# Patient Record
Sex: MALE | Race: WHITE | Employment: FULL TIME | URBAN - METROPOLITAN AREA
[De-identification: names, ages, dates, MRNs, and addresses within clinical notes are randomized per-mention and may not be internally consistent; named-entity substitution may affect disease eponyms.]

---

## 2017-06-01 ENCOUNTER — OFFICE VISIT (OUTPATIENT)
Dept: NEUROLOGY | Age: 47
End: 2017-06-01

## 2017-06-01 VITALS
HEIGHT: 70 IN | BODY MASS INDEX: 27.77 KG/M2 | HEART RATE: 73 BPM | WEIGHT: 194 LBS | SYSTOLIC BLOOD PRESSURE: 124 MMHG | DIASTOLIC BLOOD PRESSURE: 84 MMHG | RESPIRATION RATE: 15 BRPM | OXYGEN SATURATION: 97 % | TEMPERATURE: 99.1 F

## 2017-06-01 DIAGNOSIS — G44.029 CHRONIC CLUSTER HEADACHE, NOT INTRACTABLE: Primary | ICD-10-CM

## 2017-06-01 RX ORDER — RIZATRIPTAN BENZOATE 10 MG/1
10 TABLET ORAL
Qty: 9 TAB | Refills: 5 | Status: SHIPPED | OUTPATIENT
Start: 2017-06-01 | End: 2018-09-12 | Stop reason: SDUPTHER

## 2017-06-01 RX ORDER — SUMATRIPTAN 100 MG/1
100 TABLET, FILM COATED ORAL
Qty: 27 TAB | Refills: 3 | Status: SHIPPED | OUTPATIENT
Start: 2017-06-01 | End: 2017-09-28 | Stop reason: SDUPTHER

## 2017-06-01 NOTE — PROGRESS NOTES
[address] [facility] [address]   [place] [address]   [place], [address]    [facility]   [phone] Fax               Chief Complaint   Patient presents with    Migraine     follow up     Current Outpatient Prescriptions   Medication Sig Dispense Refill    SUMAtriptan (IMITREX) 100 mg tablet Take 1 Tab by mouth once as needed for Migraine for up to 1 dose. 27 Tab 3    SUMAtriptan succinate (ZEMBRACE SYMTOUCH) 3 mg/0.5 mL pnij 1 Syringe by SubCUTAneous route as needed. 1 at HA onset and repeat q 1 hour until STEWART relieved or until used 4 in 24 hours 8 Syringe 5    rizatriptan (MAXALT) 10 mg tablet Take 1 Tab by mouth once as needed for Migraine for up to 1 dose. May repeat in 2 hours if needed 9 Tab 5    verapamil (CALAN) 40 mg tablet Take 1 Tab by mouth three (3) times daily. 90 Tab 2      No Known Allergies  Social History   Substance Use Topics    Smoking status: Never Smoker    Smokeless tobacco: Never Used    Alcohol use Yes     Patient returns today for follow-up cluster headaches. He typically gets clusters in August.  They have not yet recurred. We discussed his medication regimen when they occur. He has not had any new symptoms. Doing well. No fever or chills. No edema. No chest pain. Examination  Visit Vitals    /84    Pulse 73    Temp 99.1 °F (37.3 °C)    Resp 15    Ht 5' 10\" (1.778 m)    Wt 88 kg (194 lb)    SpO2 97%    BMI 27.84 kg/m2     He is a very pleasant gentleman. He is awake alert oriented and conversant. Normal speech and language. No icterus. Steady gait. Impression/Plan  Cluster headaches which at this point and not recurred. Historically he gets a cluster around August.  I have given him some Zembrace samples. I have also given him a prescription for Zembrace and a card and that will give him his 100 mg Imitrex tablets free with the Zembrace. I refilled his Maxalt as well.   We discussed what to do if the cluster starts as it is expected to do. As long as he can control his headaches with this regimen then I will see him in 6 months. Certainly if he gets into the cluster we cannot control the headaches he will give us a call and we will make manipulation such as giving him some Decadron as well as starting him on Depakote or even sending him to the infusion center etc.  We will set follow-up at this point for 6 months but again certainly bring her back earlier if needed. This note was created using voice recognition software. Despite editing, there may be syntax errors. This note will not be viewable in [address]

## 2017-06-01 NOTE — MR AVS SNAPSHOT
Visit Information Date & Time Provider Department Dept. Phone Encounter #  
 6/1/2017  8:40 AM Ginger Branch MD [facility] [phone] [id number] Follow-up Instructions Return in about 6 months (around 12/1/2017). Upcoming Health Maintenance Date Due DTaP/Tdap/Td series (1 - Tdap) 6/10/1990 INFLUENZA AGE 9 TO ADULT 8/1/2017 Allergies as of 6/1/2017  Review Complete On: 6/1/2017 By: Aldo Nava LPN No Known Allergies Current Immunizations  Never Reviewed No immunizations on file. Not reviewed this visit You Were Diagnosed With   
  
 Codes Comments Chronic cluster headache, not intractable    -  Primary ICD-10-CM: G44.029 
ICD-9-CM: 339.02 Vitals BP Pulse Temp Resp Height(growth percentile) Weight(growth percentile) 124/84 73 99.1 °F (37.3 °C) 15 5' 10\" (1.778 m) 194 lb (88 kg) SpO2 BMI Smoking Status 97% 27.84 kg/m2 Never Smoker Vitals History BMI and BSA Data Body Mass Index Body Surface Area  
 27.84 kg/m 2 2.08 m 2 Preferred Pharmacy Pharmacy Name Phone [facility] [address] [facility] [phone] Your Updated Medication List  
  
   
This list is accurate as of: 6/1/17  9:23 AM.  Always use your most recent med list.  
  
  
  
  
 rizatriptan 10 mg tablet Commonly known as:  Kaci [place] Take 1 Tab by mouth once as needed for Migraine for up to 1 dose. May repeat in 2 hours if needed * SUMAtriptan 100 mg tablet Commonly known as:  IMITREX Take 1 Tab by mouth once as needed for Migraine for up to 1 dose. * SUMAtriptan succinate 3 mg/0.5 mL Pnij Commonly known as:  Sage Finney  
1 Syringe by SubCUTAneous route as needed. 1 at HA onset and repeat q 1 hour until STEWART relieved or until used 4 in 24 hours  
  
 verapamil 40 mg tablet Commonly known as:  CALAN Take 1 Tab by mouth three (3) times daily. * Notice: This list has 2 medication(s) that are the same as other medications prescribed for you. Read the directions carefully, and ask your doctor or other care provider to review them with you. Prescriptions Sent to Pharmacy Refills SUMAtriptan (IMITREX) 100 mg tablet 3 Sig: Take 1 Tab by mouth once as needed for Migraine for up to 1 dose. Class: Normal  
 Pharmacy: [address] Ph #: [phone] Route: Oral  
 SUMAtriptan succinate (ZEMBRACE SYMTOUCH) 3 mg/0.5 mL pnij 5 Si Syringe by SubCUTAneous route as needed. 1 at HA onset and repeat q 1 hour until STEWART relieved or until used 4 in 24 hours Class: Normal  
 Pharmacy: [address] Ph #: [phone] Route: SubCUTAneous  
 rizatriptan (MAXALT) 10 mg tablet 5 Sig: Take 1 Tab by mouth once as needed for Migraine for up to 1 dose. May repeat in 2 hours if needed Class: Normal  
 Pharmacy: [address] Ph #: [phone] Route: Oral  
  
Follow-up Instructions Return in about 6 months (around 2017). Patient Instructions Information Regarding Testing If you have physican order for a test or a medication denied by your insurance company, this does not mean the test or medication is not appropriate for you as that is a medical decision, not a decision to be made by an insurance company representative or by an [facility] physician who has not interviewed and examined you. This is a decision to be made between you and your physician. The denial of services is a contractual matter between you and your insurance company, not an issue between your physician and the insurance company. If your test or medication is denied, you can take the following steps to help resolve the issue: 1.  File a complaint with the [facility] of Insurance regarding your insurance company's denial of services ordered for you. You can do this either by calling them directly or by completing an on-line complaint form on the AFFiRiS. This can be found at [email or web address].[email or web address] 2. Also file a formal complaint with your insurance company and ask to have the name of the person denying the service so that you may explore a legal option should you be harmed by this denial of service. Again, the fact the insurance company will not pay for the service does not mean it is not medically necessary and I would encourage you to follow through with the plan that was made with your physician 3. File a written complaint with your employer so your employer and benefit manager is aware of the poor coverage they are providing their employees. If you have medicare/medicaid, complain to your representative in the House and to your Hortencia Conn. PRESCRIPTION REFILL POLICY [facility] Statement to Patients April 1, 2014 In an effort to ensure the large volume of patient prescription refills is processed in the most efficient and expeditious manner, we are asking our patients to assist us by calling your Pharmacy for all prescription refills, this will include also your  Mail Order Pharmacy. The pharmacy will contact our office electronically to continue the refill process. Please do not wait until the last minute to call your pharmacy. We need at least 48 hours (2days) to fill prescriptions. We also encourage you to call your pharmacy before going to  your prescription to make sure it is ready. With regard to controlled substance prescription refill requests (narcotic refills) that need to be picked up at our office, we ask your cooperation by providing us with at least 72 hours (3days) notice that you will need a refill. We will not refill narcotic prescription refill requests after 4:00pm on any weekday, Monday through Thursday, or after 2:00pm on Fridays, or on the weekends. We encourage everyone to explore another way of getting your prescription refill request processed using WeVideo.It, our patient web portal through our electronic medical record system. WeVideo.It is an efficient and effective way to communicate your medication request directly to the office and  downloadable as an heavenly on your smart phone . WeVideo.It also features a review functionality that allows you to view your medication list as well as leave messages for your physician. Are you ready to get connected? If so please review the attatched instructions or speak to any of our staff to get you set up right away! Thank you so much for your cooperation. Should you have any questions please contact our Practice Administrator. The Physicians and Staff,  [facility] If we have ordered testing for you, we do not call patients with results and we do not give test results over the phone. We schedule follow up appointments so that your results can be discussed in person and any questions you have regarding them may be addressed. If something of concern is revealed on your test, we will call you for a sooner follow up appointment. Additionally, results may be found by using the My Chart feature and one of our patient service representatives at the  can give you instructions on how to access this feature of our electronic medical record system. Lavell Magana [address] What is a living will? A living will is a legal form you use to write down the kind of care you want at the end of your life. It is used by the health professionals who will treat you if you aren't able to decide for yourself.  
If you put your wishes in writing, your loved ones and others will know what kind of care you want. They won't need to guess. This can ease your mind and be helpful to others. A living will is not the same as an estate or property will. An estate will explains what you want to happen with your money and property after you die. Is a living will a legal document? A living will is a legal document. Each state has its own laws about living ray. If you move to another state, make sure that your living will is legal in the state where you now live. Or you might use a universal form that has been approved by many states. This kind of form can sometimes be completed and stored online. Your electronic copy will then be available wherever you have a connection to the Internet. In most cases, doctors will respect your wishes even if you have a form from a different state. · You don't need an  to complete a living will. But legal advice can be helpful if your state's laws are unclear, your health history is complicated, or your family can't agree on what should be in your living will. · You can change your living will at any time. Some people find that their wishes about end-of-life care change as their health changes. · In addition to making a living will, think about completing a medical power of  form. This form lets you name the person you want to make end-of-life treatment decisions for you (your \"health care agent\") if you're not able to. Many hospitals and nursing homes will give you the forms you need to complete a living will and a medical power of . · Your living will is used only if you can't make or communicate decisions for yourself anymore. If you become able to make decisions again, you can accept or refuse any treatment, no matter what you wrote in your living will. · Your state may offer an online registry. This is a place where you can store your living will online so the doctors and nurses who need to treat you can find it right away. What should you think about when creating a living will? Talk about your end-of-life wishes with your family members and your doctor. Let them know what you want. That way the people making decisions for you won't be surprised by your choices. Think about these questions as you make your living will: · Do you know enough about life support methods that might be used? If not, talk to your doctor so you know what might be done if you can't breathe on your own, your heart stops, or you're unable to swallow. · What things would you still want to be able to do after you receive life-support methods? Would you want to be able to walk? To speak? To eat on your own? To live without the help of machines? · If you have a choice, where do you want to be cared for? In your home? At a hospital or nursing home? · Do you want certain Protestant practices performed if you become very ill? · If you have a choice at the end of your life, where would you prefer to die? At home? In a hospital or nursing home? Somewhere else? · Would you prefer to be buried or cremated? · Do you want your organs to be donated after you die? What should you do with your living will? · Make sure that your family members and your health care agent have copies of your living will. · Give your doctor a copy of your living will to keep in your medical record. If you have more than one doctor, make sure that each one has a copy. · You may want to put a copy of your living will where it can be easily found. Where can you learn more? Go to http://cara-wally.info/. Enter X711 in the search box to learn more about \"Learning About Living Carol Ann Garcia. \" Current as of: February 24, 2016 Content Version: 11.2 © 7797-1399 [employer]. Care instructions adapted under license by [employer] (which disclaims liability or warranty for this information).  If you have questions about a medical condition or this instruction, always ask your healthcare professional. [address] any warranty or liability for your use of this information. Introducing [facility]! Dear Ailyn Harris: Thank you for requesting a Epiphyte account. Our records indicate that you already have an active Epiphyte account. You can access your account anytime at [email or web address] Did you know that you can access your hospital and ER discharge instructions at any time in Epiphyte? You can also review all of your test results from your hospital stay or ER visit. Additional Information If you have questions, please visit the Frequently Asked Questions section of the Epiphyte website at [email or web address] Remember, Epiphyte is NOT to be used for urgent needs. For medical emergencies, dial 911. Now available from your iPhone and Android! Please provide this summary of care documentation to your next provider. Your primary care clinician is listed as Petra Woody. If you have any questions after today's visit, please call [phone].

## 2017-06-01 NOTE — PATIENT INSTRUCTIONS
Information Regarding Testing     If you have physican order for a test or a medication denied by your insurance company, this does not mean the test or medication is not appropriate for you as that is a medical decision, not a decision to be made by an insurance company representative or by an [facility] physician who has not interviewed and examined you. This is a decision to be made between you and your physician. The denial of services is a contractual matter between you and your insurance company, not an issue between your physician and the insurance company. If your test or medication is denied, you can take the following steps to help resolve the issue:    1. File a complaint with the [facility] regarding your insurance company's denial of services ordered for you. You can do this either by calling them directly or by completing an on-line complaint form on the TraderTools. This can be found at www.The Poker Barrel    2. Also file a formal complaint with your insurance company and ask to have the name of the person denying the service so that you may explore a legal option should you be harmed by this denial of service. Again, the fact the insurance company will not pay for the service does not mean it is not medically necessary and I would encourage you to follow through with the plan that was made with your physician    3. File a written complaint with your employer so your employer and benefit manager is aware of the poor coverage they are providing their employees. If you have medicare/medicaid, complain to your representative in the House and to your Hortencia oCnn.       [facility]   Statement to Patients  April 1, 2014      In an effort to ensure the large volume of patient prescription refills is processed in the most efficient and expeditious manner, we are asking our patients to assist us by calling your Pharmacy for all prescription refills, this will include also your  Mail Order Pharmacy. The pharmacy will contact our office electronically to continue the refill process. Please do not wait until the last minute to call your pharmacy. We need at least 48 hours (2days) to fill prescriptions. We also encourage you to call your pharmacy before going to  your prescription to make sure it is ready. With regard to controlled substance prescription refill requests (narcotic refills) that need to be picked up at our office, we ask your cooperation by providing us with at least 72 hours (3days) notice that you will need a refill. We will not refill narcotic prescription refill requests after 4:00pm on any weekday, Monday through Thursday, or after 2:00pm on Fridays, or on the weekends. We encourage everyone to explore another way of getting your prescription refill request processed using Julep, our patient web portal through our electronic medical record system. Julep is an efficient and effective way to communicate your medication request directly to the office and  downloadable as an heavenly on your smart phone . Julep also features a review functionality that allows you to view your medication list as well as leave messages for your physician. Are you ready to get connected? If so please review the attatched instructions or speak to any of our staff to get you set up right away! Thank you so much for your cooperation. Should you have any questions please contact our Practice Administrator. The Physicians and Staff,  [facility]       If we have ordered testing for you, we do not call patients with results and we do not give test results over the phone. We schedule follow up appointments so that your results can be discussed in person and any questions you have regarding them may be addressed.   If something of concern is revealed on your test, we will call you for a sooner follow up appointment. Additionally, results may be found by using the My Chart feature and one of our patient service representatives at the  can give you instructions on how to access this feature of our electronic medical record system. Learning About Living Nancy  What is a living will? A living will is a legal form you use to write down the kind of care you want at the end of your life. It is used by the health professionals who will treat you if you aren't able to decide for yourself. If you put your wishes in writing, your loved ones and others will know what kind of care you want. They won't need to guess. This can ease your mind and be helpful to others. A living will is not the same as an estate or property will. An estate will explains what you want to happen with your money and property after you die. Is a living will a legal document? A living will is a legal document. Each state has its own laws about living ray. If you move to another state, make sure that your living will is legal in the state where you now live. Or you might use a universal form that has been approved by many states. This kind of form can sometimes be completed and stored online. Your electronic copy will then be available wherever you have a connection to the Internet. In most cases, doctors will respect your wishes even if you have a form from a different state. · You don't need an  to complete a living will. But legal advice can be helpful if your state's laws are unclear, your health history is complicated, or your family can't agree on what should be in your living will. · You can change your living will at any time. Some people find that their wishes about end-of-life care change as their health changes. · In addition to making a living will, think about completing a medical power of  form.  This form lets you name the person you want to make end-of-life treatment decisions for you (your \"health care agent\") if you're not able to. Many hospitals and nursing homes will give you the forms you need to complete a living will and a medical power of . · Your living will is used only if you can't make or communicate decisions for yourself anymore. If you become able to make decisions again, you can accept or refuse any treatment, no matter what you wrote in your living will. · Your state may offer an online registry. This is a place where you can store your living will online so the doctors and nurses who need to treat you can find it right away. What should you think about when creating a living will? Talk about your end-of-life wishes with your family members and your doctor. Let them know what you want. That way the people making decisions for you won't be surprised by your choices. Think about these questions as you make your living will:  · Do you know enough about life support methods that might be used? If not, talk to your doctor so you know what might be done if you can't breathe on your own, your heart stops, or you're unable to swallow. · What things would you still want to be able to do after you receive life-support methods? Would you want to be able to walk? To speak? To eat on your own? To live without the help of machines? · If you have a choice, where do you want to be cared for? In your home? At a hospital or nursing home? · Do you want certain Holiness practices performed if you become very ill? · If you have a choice at the end of your life, where would you prefer to die? At home? In a hospital or nursing home? Somewhere else? · Would you prefer to be buried or cremated? · Do you want your organs to be donated after you die? What should you do with your living will? · Make sure that your family members and your health care agent have copies of your living will. · Give your doctor a copy of your living will to keep in your medical record.  If you have more than one doctor, make sure that each one has a copy. · You may want to put a copy of your living will where it can be easily found. Where can you learn more? Go to http://caar-wally.info/. Enter L202 in the search box to learn more about \"Learning About Living Perroy. \"  Current as of: February 24, 2016  Content Version: 11.2  © 9013-2355 Theracos. Care instructions adapted under license by [employer]Zlogix (which disclaims liability or warranty for this information). If you have questions about a medical condition or this instruction, always ask your healthcare professional. [address] any warranty or liability for your use of this information.

## 2017-09-28 RX ORDER — SUMATRIPTAN 100 MG/1
TABLET, FILM COATED ORAL
Qty: 9 TAB | Refills: 0 | Status: SHIPPED | OUTPATIENT
Start: 2017-09-28 | End: 2018-01-03 | Stop reason: SDUPTHER

## 2018-01-04 RX ORDER — SUMATRIPTAN 100 MG/1
TABLET, FILM COATED ORAL
Qty: 9 TAB | Refills: 0 | Status: SHIPPED | OUTPATIENT
Start: 2018-01-04 | End: 2018-06-14 | Stop reason: SDUPTHER

## 2018-06-14 RX ORDER — SUMATRIPTAN 100 MG/1
TABLET, FILM COATED ORAL
Qty: 9 TAB | Refills: 0 | Status: SHIPPED | OUTPATIENT
Start: 2018-06-14 | End: 2018-09-12 | Stop reason: SDUPTHER

## 2018-09-12 ENCOUNTER — OFFICE VISIT (OUTPATIENT)
Dept: NEUROLOGY | Age: 48
End: 2018-09-12

## 2018-09-12 VITALS
WEIGHT: 196 LBS | BODY MASS INDEX: 28.06 KG/M2 | OXYGEN SATURATION: 98 % | SYSTOLIC BLOOD PRESSURE: 128 MMHG | HEIGHT: 70 IN | HEART RATE: 87 BPM | DIASTOLIC BLOOD PRESSURE: 88 MMHG

## 2018-09-12 DIAGNOSIS — G44.029 CHRONIC CLUSTER HEADACHE, NOT INTRACTABLE: Primary | ICD-10-CM

## 2018-09-12 RX ORDER — PANTOPRAZOLE SODIUM 40 MG/1
40 TABLET, DELAYED RELEASE ORAL DAILY
COMMUNITY

## 2018-09-12 RX ORDER — RIZATRIPTAN BENZOATE 10 MG/1
10 TABLET ORAL
Qty: 9 TAB | Refills: 5 | Status: SHIPPED | OUTPATIENT
Start: 2018-09-12 | End: 2019-04-26 | Stop reason: SDUPTHER

## 2018-09-12 RX ORDER — SUMATRIPTAN 100 MG/1
TABLET, FILM COATED ORAL
Qty: 9 TAB | Refills: 3 | Status: SHIPPED | OUTPATIENT
Start: 2018-09-12 | End: 2019-04-26 | Stop reason: SDUPTHER

## 2018-09-12 NOTE — PATIENT INSTRUCTIONS
PRESCRIPTION REFILL POLICY [facility] Statement to Patients April 1, 2014 In an effort to ensure the large volume of patient prescription refills is processed in the most efficient and expeditious manner, we are asking our patients to assist us by calling your Pharmacy for all prescription refills, this will include also your  Mail Order Pharmacy. The pharmacy will contact our office electronically to continue the refill process. Please do not wait until the last minute to call your pharmacy. We need at least 48 hours (2days) to fill prescriptions. We also encourage you to call your pharmacy before going to  your prescription to make sure it is ready. With regard to controlled substance prescription refill requests (narcotic refills) that need to be picked up at our office, we ask your cooperation by providing us with at least 72 hours (3days) notice that you will need a refill. We will not refill narcotic prescription refill requests after 4:00pm on any weekday, Monday through Thursday, or after 2:00pm on Fridays, or on the weekends. We encourage everyone to explore another way of getting your prescription refill request processed using [employer], our patient web portal through our electronic medical record system. Thera[employer] is an efficient and effective way to communicate your medication request directly to the office and  downloadable as an heavenly on your smart phone . The[employer]Sim also features a review functionality that allows you to view your medication list as well as leave messages for your physician. Are you ready to get connected? If so please review the attatched instructions or speak to any of our staff to get you set up right away! Thank you so much for your cooperation. Should you have any questions please contact our Practice Administrator. The Physicians and Staff,  [facility] Patient Instruction Plan/ Result Policy If we have ordered testing for you, know that; \"NO NEWS IS GOOD NEWS! \" It is our policy that we know longer call patients with results, nor do we  give test results over the phone. We schedule follow up appointments so that your results can be discussed in person. This allows you to address any questions you have regarding the results. If something of concern is revealed on your test, we will contact you to discuss the matter and if needed schedule a sooner follow up appointment. Additionally, results may be found by using the My Chart feature and one of our patient service representatives at the  can give you instructions on how to access this feature to utilize our electronic medical record system. Thank you for your understanding.

## 2018-09-12 NOTE — MR AVS SNAPSHOT
[address] [address] [id number] [place] [address] 
[phone] Patient: Jessica Rivera MRN: [id number] :1970 Visit Information Date & Time Provider Department Dept. Phone Encounter #  
 2018  2:00 PM Bakari Mayfield NP [address] [facility] [phone] [id number] Follow-up Instructions Return in about 6 months (around 3/12/2019). Your Appointments 2019  8:40 AM  
Follow Up with Corinne Pressman, MD  
[facility] Appt Note: cluster headache [place]  [address] [facility] [address] [id number] [phone]  
  
   
 [place]  [address] [address] [address] Upcoming Health Maintenance Date Due DTaP/Tdap/Td series (1 - Tdap) 6/10/1990 Influenza Age 5 to Adult 2018 Allergies as of 2018  Review Complete On: 2017 By: Corinne Pressman, MD  
 No Known Allergies Current Immunizations  Never Reviewed No immunizations on file. Not reviewed this visit You Were Diagnosed With   
  
 Codes Comments Chronic cluster headache, not intractable    -  Primary ICD-10-CM: G44.029 
ICD-9-CM: 339.02 Vitals BP Pulse Height(growth percentile) Weight(growth percentile) SpO2 BMI  
 128/88 87 5' 10\" (1.778 m) 196 lb (88.9 kg) 98% 28.12 kg/m2 Smoking Status Never Smoker BMI and BSA Data Body Mass Index Body Surface Area  
 28.12 kg/m 2 2.1 m 2 Preferred Pharmacy Pharmacy Name Phone [facility] - [address] [facility], [facility] [phone] Your Updated Medication List  
  
   
This list is accurate as of 18  2:11 PM.  Always use your most recent med list.  
  
  
  
  
 pantoprazole 40 mg tablet Commonly known as:  PROTONIX Take 40 mg by mouth daily. rizatriptan 10 mg tablet Commonly known as:  Malaika Shear Take 1 Tab by mouth once as needed for Migraine for up to 1 dose. May repeat in 2 hours if needed * SUMAtriptan succinate 3 mg/0.5 mL Pnij Commonly known as:  Lavinia Ax  
1 Syringe by SubCUTAneous route as needed. 1 at HA onset and repeat q 1 hour until STEWART relieved or until used 4 in 24 hours * SUMAtriptan 100 mg tablet Commonly known as:  IMITREX  
TAKE ONE TABLET BY MOUTH ONCE AS NEEDED FOR MIGRAINE  
  
 verapamil 40 mg tablet Commonly known as:  CALAN Take 1 Tab by mouth three (3) times daily. * Notice: This list has 2 medication(s) that are the same as other medications prescribed for you. Read the directions carefully, and ask your doctor or other care provider to review them with you. Prescriptions Sent to Pharmacy Refills SUMAtriptan (IMITREX) 100 mg tablet 3 Sig: TAKE ONE TABLET BY MOUTH ONCE AS NEEDED FOR MIGRAINE Class: Normal  
 Pharmacy: [address] Ph #: [phone]  
 rizatriptan (MAXALT) 10 mg tablet 5 Sig: Take 1 Tab by mouth once as needed for Migraine for up to 1 dose. May repeat in 2 hours if needed Class: Normal  
 Pharmacy: [address] Ph #: [phone] Route: Oral  
  
Follow-up Instructions Return in about 6 months (around 3/12/2019). Patient Instructions PRESCRIPTION REFILL POLICY [facility] Statement to Patients April 1, 2014 In an effort to ensure the large volume of patient prescription refills is processed in the most efficient and expeditious manner, we are asking our patients to assist us by calling your Pharmacy for all prescription refills, this will include also your  Mail Order Pharmacy. The pharmacy will contact our office electronically to continue the refill process. Please do not wait until the last minute to call your pharmacy. We need at least 48 hours (2days) to fill prescriptions. We also encourage you to call your pharmacy before going to  your prescription to make sure it is ready. With regard to controlled substance prescription refill requests (narcotic refills) that need to be picked up at our office, we ask your cooperation by providing us with at least 72 hours (3days) notice that you will need a refill. We will not refill narcotic prescription refill requests after 4:00pm on any weekday, Monday through Thursday, or after 2:00pm on Fridays, or on the weekends. We encourage everyone to explore another way of getting your prescription refill request processed using [employer], our patient web portal through our electronic medical record system. [employer] is an efficient and effective way to communicate your medication request directly to the office and  downloadable as an heavenly on your smart phone . [employer] also features a review functionality that allows you to view your medication list as well as leave messages for your physician. Are you ready to get connected? If so please review the attatched instructions or speak to any of our staff to get you set up right away! Thank you so much for your cooperation. Should you have any questions please contact our Practice Administrator. The Physicians and Staff,  [facility] Patient Instruction Plan/ Result Policy If we have ordered testing for you, know that; \"NO NEWS IS GOOD NEWS! \" It is our policy that we know longer call patients with results, nor do we  give test results over the phone. We schedule follow up appointments so that your results can be discussed in person. This allows you to address any questions you have regarding the results.   If something of concern is revealed on your test, we will contact you to discuss the matter and if needed schedule a sooner follow up appointment. Additionally, results may be found by using the My Chart feature and one of our patient service representatives at the  can give you instructions on how to access this feature to utilize our electronic medical record system. Thank you for your understanding. Introducing [facility]! Dear Samy Burton: Thank you for requesting a eCareer account. Our records indicate that you already have an active eCareer account. You can access your account anytime at [email or web address] Did you know that you can access your hospital and ER discharge instructions at any time in eCareer? You can also review all of your test results from your hospital stay or ER visit. Additional Information If you have questions, please visit the Frequently Asked Questions section of the eCareer website at [email or web address] Remember, eCareer is NOT to be used for urgent needs. For medical emergencies, dial 911. Now available from your iPhone and Android! Please provide this summary of care documentation to your next provider. Your primary care clinician is listed as Sanjay Gamez. If you have any questions after today's visit, please call [phone].

## 2018-09-12 NOTE — PROGRESS NOTES
Date:  18 Name:  Erik Villafana 
:  1970 MRN:  [id number] PCP:  Jim Brizuela MD 
 
Chief Complaint Patient presents with  
 Head Pain HISTORY OF PRESENT ILLNESS:Follow up visit for cluster headache. patient reports that he tends to get  Clusters headaches in August.  They have not yet recurred. We discussed his medication regimen when they occur. He has not had any new symptoms. Doing well. No fever or chills. No edema. No chest pain Except as noted above, denies  fever, chills, cough. No CP or SOB. No dysuria, loss of bowel or bladder control. No Weight loss. Appetite good. Sleeping well. No sweats. No edema. No bruising or bleeding. No nausea or vomit. No diarrhea. No frequency, urgency, No depressive sxs. No anxiety. Denies sore throat, nasal congestion, nasal discharge, epistaxis, tinnitus, hearing loss, back pain, muscle pain, or joint pain. Current Outpatient Prescriptions Medication Sig  pantoprazole (PROTONIX) 40 mg tablet Take 40 mg by mouth daily.  SUMAtriptan (IMITREX) 100 mg tablet TAKE ONE TABLET BY MOUTH ONCE AS NEEDED FOR MIGRAINE  SUMAtriptan succinate (ONZETRA XSAIL) 11 mg aepb 11 mg by Nasal route as needed. Both nasal at the onset of headache redose in 2 hrs. Indications: BIN number#[id number]  ('\"0 copay)  verapamil (CALAN) 40 mg tablet Take 1 Tab by mouth three (3) times daily.  SUMAtriptan succinate (ZEMBRACE SYMTOUCH) 3 mg/0.5 mL pnij 1 Syringe by SubCUTAneous route as needed. 1 at HA onset and repeat q 1 hour until STEWART relieved or until used 4 in 24 hours No current facility-administered medications for this visit. No Known Allergies Past Medical History:  
Diagnosis Date  Migraines  Polycythemia Past Surgical History:  
Procedure Laterality Date  HX ORTHOPAEDIC    
 95 RT ankle, 05 left knee Social History Social History  Marital status:    Spouse name: N/A  
  Number of children: N/A  
 Years of education: N/A Occupational History  Not on file. Social History Main Topics  Smoking status: Never Smoker  Smokeless tobacco: Never Used  Alcohol use Yes  Drug use: Not on file  Sexual activity: Not on file Other Topics Concern  Not on file Social History Narrative Family History Problem Relation Age of Onset  Stroke Maternal Grandmother  Cancer Maternal Aunt PHYSICAL EXAMINATION:   
Visit Vitals  /88  Pulse 87  
 Ht 5' 10\" (1.778 m)  Wt 196 lb (88.9 kg)  SpO2 98%  BMI 28.12 kg/m2 General: Well defined, nourished, and groomed individual in no acute distress. Neck: Supple, nontender, no bruits, no pain with resistance to active range of motion. Heart: Regular rate and rhythm, no murmurs, rub, or gallop. Normal S1S2. Lungs: Clear to auscultation bilaterally with equal chest expansion, no cough, no wheeze Musculoskeletal: Extremities revealed no edema and had full range of motion of joints. Psych: Good mood and bright affect 
   
NEUROLOGICAL EXAMINATION:  
Mental Status: Alert and oriented to person, place, and time  
   
Cranial Nerves:  
II, III, IV, VI: Visual acuity grossly intact. Visual fields are normal.  
Pupils are equal, round, and reactive to light and accommodation. Extra-ocular movements are full and fluid. Fundoscopic exam was benign, no ptosis or nystagmus. V-XII: Hearing is grossly intact. Facial features are symmetric, with normal sensation and strength. The palate rises symmetrically and the tongue protrudes midline. Sternocleidomastoids 5/5.  
   
Motor Examination: Normal tone, bulk, and strength, 5/5 muscle strength throughout.  
   
Coordination: No resting or intention tremor 
   
Gait and Station: Steady while walking. Normal arm swing. No pronator drift. No muscle wasting or fasiculations noted.  
   
Reflexes: DTRs 2+ throughout.    
 
 
 
ASSESSMENT AND PLAN 
 ICD-10-CM ICD-9-CM 1. Chronic cluster headache, not intractable G44.029 339.02 Cluster, continue Verapamil 40 mg when cluster headaches develop. We provided a sample of Onzetra to try and use to abort his headaches. Purpose and potential side effects were discussed and they have verbalized understanding. Follow up in 6 months This note will not be viewable in [facility] Karel Rashid NP

## 2018-09-12 NOTE — PROGRESS NOTES
Chief Complaint Patient presents with  
 Head Pain 1. Have you been to the ER, urgent care clinic since your last visit? Hospitalized since your last visit? No 
 
2. Have you seen or consulted any other health care providers outside of the [address] [place] [address] since your last visit? Include any pap smears or colon screening. No  
 
Visit Vitals  /88  Pulse 87  
 Ht 5' 10\" (1.778 m)  Wt 88.9 kg (196 lb)  SpO2 98%  BMI 28.12 kg/m2

## 2019-04-26 ENCOUNTER — OFFICE VISIT (OUTPATIENT)
Dept: NEUROLOGY | Age: 49
End: 2019-04-26

## 2019-04-26 VITALS
BODY MASS INDEX: 28.35 KG/M2 | HEIGHT: 70 IN | SYSTOLIC BLOOD PRESSURE: 122 MMHG | RESPIRATION RATE: 20 BRPM | DIASTOLIC BLOOD PRESSURE: 78 MMHG | WEIGHT: 198 LBS

## 2019-04-26 DIAGNOSIS — G44.029 CHRONIC CLUSTER HEADACHE, NOT INTRACTABLE: Primary | ICD-10-CM

## 2019-04-26 RX ORDER — VERAPAMIL HYDROCHLORIDE 40 MG/1
40 TABLET ORAL 3 TIMES DAILY
Qty: 90 TAB | Refills: 2 | Status: SHIPPED | OUTPATIENT
Start: 2019-04-26

## 2019-04-26 RX ORDER — SUMATRIPTAN 100 MG/1
TABLET, FILM COATED ORAL
Qty: 9 TAB | Refills: 3 | Status: SHIPPED | OUTPATIENT
Start: 2019-04-26 | End: 2019-11-18 | Stop reason: SDUPTHER

## 2019-04-26 RX ORDER — RIZATRIPTAN BENZOATE 10 MG/1
10 TABLET ORAL
Qty: 9 TAB | Refills: 5 | Status: SHIPPED | OUTPATIENT
Start: 2019-04-26 | End: 2020-02-19

## 2019-04-26 NOTE — PROGRESS NOTES
[facility] and [address] at [facility] at [address] [id number] [place] [address] Dr [place], [address] [facility] [address] [facility], [address] [place] [address]  
[phone] Chief Complaint Patient presents with  
 Headache Current Outpatient Medications Medication Sig Dispense Refill  pantoprazole (PROTONIX) 40 mg tablet Take 40 mg by mouth daily.  SUMAtriptan succinate (ONZETRA XSAIL) 11 mg aepb 11 mg by Nasal route as needed. Both nasal at the onset of headache redose in 2 hrs. Indications: BIN number#[id number]  ('\"0 copay) 6 Each 0  
 SUMAtriptan succinate (ZEMBRACE SYMTOUCH) 3 mg/0.5 mL pnij 1 Syringe by SubCUTAneous route as needed. 1 at HA onset and repeat q 1 hour until STEWART relieved or until used 4 in 24 hours 8 Syringe 5  verapamil (CALAN) 40 mg tablet Take 1 Tab by mouth three (3) times daily. 90 Tab 2  
 SUMAtriptan (IMITREX) 100 mg tablet TAKE ONE TABLET BY MOUTH ONCE AS NEEDED FOR MIGRAINE 9 Tab 3 No Known Allergies Social History Tobacco Use  Smoking status: Never Smoker  Smokeless tobacco: Never Used Substance Use Topics  Alcohol use: Yes  Drug use: Not on file Patient returns today for follow-up cluster headaches. He typically gets these around August of every year. He is on verapamil that he takes when he starts a cluster and he will use Imitrex combination of tablets or injections. He also has some Maxalt. Today he reports last fall he used the verapamil and he did not get a cluster. We discussed how he may been able to successfully abort the cluster with the verapamil versus just was not his year to have the cluster occur. He does get some headaches here there particularly now in the allergy season that are refractory to Excedrin or decongestant. For those the Imitrex or Maxalt will take it away. He has not used an injection.   No focal deficits and no new complaint. He just got back from [place]. Examination Visit Vitals /78 Resp 20 Ht 5' 10\" (1.778 m) Wt 89.8 kg (198 lb) BMI 28.41 kg/m² He is a very pleasant gentleman. Awake alert oriented and conversant. Normal speech and lines. Normal cognitive function. No focal deficit. Steady gait Impression/Plan Cluster headaches stable at this point. Continue with our strategy of trying to prevent them in the fall with verapamil. Typically they start in August.  Renewed his prescription. Renewed the prescription for Imitrex as well as Maxalt. I gave him a couple of Zembrace injections as well today. Follow-up in September 
 
Sofiya Gaitan MD 
 
 
This note was created using voice recognition software. Despite editing, there may be syntax errors. This note will not be viewable in [address]

## 2019-10-03 ENCOUNTER — TELEPHONE (OUTPATIENT)
Dept: NEUROLOGY | Age: 49
End: 2019-10-03

## 2019-10-03 NOTE — TELEPHONE ENCOUNTER
The patient gets Agata Cordero and he said he's almost out of it. It's too soon to get a refill so he wanted to know if he could get a sample.

## 2019-10-04 NOTE — TELEPHONE ENCOUNTER
Prior Auth APPROVED for [facility] 12/30 days by Fern. Effective dates 10/04/19 - 10/03/20. Case #[id number]. Approval will be scanned into media for review. Please send Botox Rx to [facility] (if necessary). NOTE: Pharmacy may reject due to an \"early fill. \" Patient mentioned that on phone call to Nurse [facility]. Please have pharmacy do an \"early fill\" if they push this back.      [facility] Key: [id number]

## 2019-10-08 ENCOUNTER — OFFICE VISIT (OUTPATIENT)
Dept: NEUROLOGY | Age: 49
End: 2019-10-08

## 2019-10-08 VITALS
DIASTOLIC BLOOD PRESSURE: 96 MMHG | HEIGHT: 70 IN | BODY MASS INDEX: 27.35 KG/M2 | RESPIRATION RATE: 16 BRPM | HEART RATE: 75 BPM | SYSTOLIC BLOOD PRESSURE: 140 MMHG | WEIGHT: 191 LBS | OXYGEN SATURATION: 99 %

## 2019-10-08 DIAGNOSIS — G44.029 CHRONIC CLUSTER HEADACHE, NOT INTRACTABLE: Primary | ICD-10-CM

## 2019-10-08 NOTE — PROGRESS NOTES
[facility] and [address] at [facility] at [address] [facility], [address] [facility] [place] [address] [facility], [address] [place] [address]   [phone]              Chief Complaint   Patient presents with    Migraine     most frequent cluster episodes started approx 2 wks ago, will get HAs approx 2-3 times per day, episodes occur every other yr usually in the fall. Current Outpatient Medications   Medication Sig Dispense Refill    verapamil (CALAN) 40 mg tablet Take 1 Tab by mouth three (3) times daily. 90 Tab 2    SUMAtriptan (IMITREX) 100 mg tablet TAKE ONE TABLET BY MOUTH ONCE AS NEEDED FOR MIGRAINE 9 Tab 3    pantoprazole (PROTONIX) 40 mg tablet Take 40 mg by mouth daily.  SUMAtriptan succinate (ZEMBRACE SYMTOUCH) 3 mg/0.5 mL pnij 1 at HA onset and repeat q 1 hour until STEWART relieved or until used 4 in 24 hours (Patient not taking: Reported on 10/8/2019) 8 Syringe 5    SUMAtriptan succinate (ONZETRA XSAIL) 11 mg aepb 11 mg by Nasal route as needed. Both nasal at the onset of headache redose in 2 hrs. Indications: BIN number#[id number]  ('\"0 copay) (Patient not taking: Reported on 10/8/2019) 6 Each 0      No Known Allergies  Social History     Tobacco Use    Smoking status: Never Smoker    Smokeless tobacco: Never Used   Substance Use Topics    Alcohol use: Yes    Drug use: Not on file   Patient returns today for follow-up. Cluster headaches. Typically he gets into a cluster on August of every year. He uses verapamil as prevention and he takes that at the onset of cluster. He also uses a combination of Imitrex injections and tablets. Since I saw him last he did have a cluster begin. He started taking the verapamil in August.  It seems like it was aborting them off but then 2 weeks ago he started to get a cluster. He was coming over [place] while driving back from [place].   He had a severe headache. Left-sided. He had a lot of tearing of the left eye[place] at [place] rest stop and took an Imitrex. He rested for about 30 minutes. He continued to drive home after that and moderate pain. He is getting 2-3 headaches per day all consistent with his previous clusters. He is on Indocin. He is on verapamil. He is using Imitrex. When he uses a Zembrace injections although he does not use this regularly because of too expensive, he can get relief in 9 minutes. No focal deficits. Blood pressures been running a little high and probably the end does not      Examination  Visit Vitals  BP (!) 140/96 (BP 1 Location: Right arm, BP Patient Position: Sitting)   Pulse 75   Resp 16   Ht 5' 10\" (1.778 m)   Wt 86.6 kg (191 lb)   SpO2 99%   BMI 27.41 kg/m²     Very pleasant gentleman. Awake alert oriented conversant. Normal speech-language cognition attention. No ataxia. Steady gait    Impression/Plan  Episodic cluster headache and he is in a cluster now. He has had some decrease in headaches over the last couple of days so he is unsure as to whether he may be coming to the end or not. We discussed CGRP inhibitors any modality does have an approval for intermittent cluster headache. That we have some samples at the [facility] office. He is going to stop and get his first 2 injections which is the loading dose today. We will do those and see if we can break this cholesterol together. I wrote him a prescription and gave him a printed copies of if he needs to fill that i.e. in a month he still having the headaches or the modality did not abort this cluster that he can feel that.   Discussed administration competence side effects, potential benefits, studies, etc.  We will set follow-up for 6 months but certainly be in touch should we need    Arlen Saravia MD    Total time: 25 min   Counseling / coordination time: 15 min   > 50% counseling / coordination?: Yes re: as above, formulating plan    This note was created using voice recognition software. Despite editing, there may be syntax errors. This note will not be viewable in [address]

## 2019-10-11 NOTE — TELEPHONE ENCOUNTER
----- Message from Vane Lind sent at 10/11/2019 12:23 PM EDT -----  Regarding: Brit Trevino/Telephone   General Message/Vendor Calls    Caller's first and last name:  Alejandrina Melendezs     Reason for call: Pt is returning a call regarding a pre-authorization for a prescribe medication.        Callback required yes/no and why: Yes       Best contact number(s): [phone]        Details to clarify the request: N/A

## 2019-10-15 ENCOUNTER — TELEPHONE (OUTPATIENT)
Dept: NEUROLOGY | Age: 49
End: 2019-10-15

## 2019-10-15 NOTE — TELEPHONE ENCOUNTER
----- Message from Omar Azevedo sent at 10/15/2019 10:06 AM EDT -----  Regarding: Dr. Jodi Moise  Pt requesting a call back in regards to Rx \"Zembrase\". Pt states pharmacy has not received prior authorization. Best contact [phone].

## 2019-11-18 DIAGNOSIS — G44.029 CHRONIC CLUSTER HEADACHE, NOT INTRACTABLE: ICD-10-CM

## 2019-11-18 RX ORDER — SUMATRIPTAN 100 MG/1
TABLET, FILM COATED ORAL
Qty: 9 TAB | Refills: 0 | Status: SHIPPED | OUTPATIENT
Start: 2019-11-18 | End: 2019-12-31

## 2019-12-31 DIAGNOSIS — G44.029 CHRONIC CLUSTER HEADACHE, NOT INTRACTABLE: ICD-10-CM

## 2019-12-31 RX ORDER — SUMATRIPTAN 100 MG/1
TABLET, FILM COATED ORAL
Qty: 9 TAB | Refills: 0 | Status: SHIPPED | OUTPATIENT
Start: 2019-12-31 | End: 2020-02-19

## 2020-02-19 DIAGNOSIS — G44.029 CHRONIC CLUSTER HEADACHE, NOT INTRACTABLE: ICD-10-CM

## 2020-02-19 RX ORDER — RIZATRIPTAN BENZOATE 10 MG/1
TABLET ORAL
Qty: 9 TAB | Refills: 0 | Status: SHIPPED | OUTPATIENT
Start: 2020-02-19 | End: 2020-05-12

## 2020-02-19 RX ORDER — SUMATRIPTAN 100 MG/1
TABLET, FILM COATED ORAL
Qty: 9 TAB | Refills: 0 | Status: SHIPPED | OUTPATIENT
Start: 2020-02-19 | End: 2020-05-12

## 2020-05-12 DIAGNOSIS — G44.029 CHRONIC CLUSTER HEADACHE, NOT INTRACTABLE: ICD-10-CM

## 2020-05-12 RX ORDER — SUMATRIPTAN 100 MG/1
TABLET, FILM COATED ORAL
Qty: 9 TAB | Refills: 0 | Status: SHIPPED | OUTPATIENT
Start: 2020-05-12 | End: 2020-12-09

## 2020-05-12 RX ORDER — RIZATRIPTAN BENZOATE 10 MG/1
TABLET ORAL
Qty: 9 TAB | Refills: 0 | Status: SHIPPED | OUTPATIENT
Start: 2020-05-12 | End: 2020-12-09

## 2021-01-13 ENCOUNTER — OFFICE VISIT (OUTPATIENT)
Dept: NEUROLOGY | Age: 51
End: 2021-01-13
Payer: COMMERCIAL

## 2021-01-13 VITALS
BODY MASS INDEX: 27.35 KG/M2 | HEART RATE: 73 BPM | RESPIRATION RATE: 14 BRPM | TEMPERATURE: 97.9 F | WEIGHT: 191 LBS | DIASTOLIC BLOOD PRESSURE: 86 MMHG | SYSTOLIC BLOOD PRESSURE: 120 MMHG | OXYGEN SATURATION: 98 % | HEIGHT: 70 IN

## 2021-01-13 DIAGNOSIS — G44.029 CHRONIC CLUSTER HEADACHE, NOT INTRACTABLE: ICD-10-CM

## 2021-01-13 PROCEDURE — 99213 OFFICE O/P EST LOW 20 MIN: CPT | Performed by: PSYCHIATRY & NEUROLOGY

## 2021-01-13 RX ORDER — RIZATRIPTAN BENZOATE 10 MG/1
TABLET ORAL
Qty: 27 TAB | Refills: 3 | Status: SHIPPED | OUTPATIENT
Start: 2021-01-13 | End: 2021-01-15 | Stop reason: SDUPTHER

## 2021-01-13 RX ORDER — SUMATRIPTAN 100 MG/1
TABLET, FILM COATED ORAL
Qty: 27 TAB | Refills: 3 | Status: SHIPPED | OUTPATIENT
Start: 2021-01-13 | End: 2021-01-15 | Stop reason: SDUPTHER

## 2021-01-13 RX ORDER — GALCANEZUMAB 120 MG/ML
240 INJECTION, SOLUTION SUBCUTANEOUS ONCE
Qty: 2 ML | Refills: 0 | Status: SHIPPED | COMMUNITY
Start: 2021-01-13 | End: 2021-01-13

## 2021-01-13 NOTE — PROGRESS NOTES
Chief Complaint   Patient presents with    Headache     cluster     None so far, usually starts around Aug

## 2021-01-13 NOTE — PROGRESS NOTES
[facility] and [address] at [facility] at [address] [facility], [id number] [place] [address] [address] [facility], [address] [place] [address]   [phone]              Chief Complaint   Patient presents with    Headache     cluster     Current Outpatient Medications   Medication Sig Dispense Refill    SUMAtriptan (IMITREX) 100 mg tablet TAKE ONE TABLET BY MOUTH ONCE DAILY AS NEEDED FOR MIGRAINE 9 Tab 3    rizatriptan (MAXALT) 10 mg tablet TAKE ONE TABLET BY MOUTH ONCE DAILY AS NEEDED FOR MIGRAINE- MAY REPEAT IN 2 HOURS IF NEEDED 9 Tab 0    verapamil (CALAN) 40 mg tablet Take 1 Tab by mouth three (3) times daily. 90 Tab 2    pantoprazole (PROTONIX) 40 mg tablet Take 40 mg by mouth daily.  galcanezumab-gnlm (EMGALITY SYRINGE) 120 mg/mL syrg 120 mg by SubCUTAneous route every thirty (30) days. 1 Pen 2      No Known Allergies  Social History     Tobacco Use    Smoking status: Never Smoker    Smokeless tobacco: Never Used   Substance Use Topics    Alcohol use: Yes    Drug use: Not on file     58-year-old gentleman who returns today for follow-up cluster headache. His last visit with me was in October 2019. His episodic cluster headache. Uses Imitrex or Maxalt for abortive therapy. He clusters every other year around mid August.  This has been his pattern since he has been in the 25s. Last year he was on verapamil and got into a cluster and had the loading dose of Emgality. That aborted the cluster. Examination  Visit Vitals  /86 (BP 1 Location: Left arm, BP Patient Position: Sitting)   Pulse 73   Temp 97.9 °F (36.6 °C)   Resp 14   Ht 5' 10\" (1.778 m)   Wt 86.6 kg (191 lb)   SpO2 98%   BMI 27.41 kg/m²     Pleasant gentleman. Awake alert oriented and conversant. Normal speech and language. Normal cognition.   No ataxia    Impression/Plan  Episodic cluster headache  He is due to cluster mid August of this year  I gave him a sample of Emgality today and August 1 he will use that and start verapamil  Refilled Maxalt and Imitrex  He will be scheduled in for the first week of September in case he is in the middle of a cluster that needs to be broken by other means  If this regimen is successful and the cluster is avoided or quickly aborted the certainly can call and cancel and I will see him next year    Donna Gaytan MD        This note was created using voice recognition software. Despite editing, there may be syntax errors.

## 2021-01-15 ENCOUNTER — PATIENT MESSAGE (OUTPATIENT)
Dept: NEUROLOGY | Age: 51
End: 2021-01-15

## 2021-01-15 DIAGNOSIS — G44.029 CHRONIC CLUSTER HEADACHE, NOT INTRACTABLE: ICD-10-CM

## 2021-01-15 RX ORDER — SUMATRIPTAN 100 MG/1
TABLET, FILM COATED ORAL
Qty: 9 TAB | Refills: 11 | Status: SHIPPED | OUTPATIENT
Start: 2021-01-15 | End: 2022-03-07

## 2021-01-15 RX ORDER — RIZATRIPTAN BENZOATE 10 MG/1
TABLET ORAL
Qty: 9 TAB | Refills: 11 | Status: SHIPPED | OUTPATIENT
Start: 2021-01-15 | End: 2022-03-07

## 2021-01-15 NOTE — TELEPHONE ENCOUNTER
From: Jane Harrison  To: [facility]  Sent: 1/15/2021 10:02 AM EST  Subject: Prescription Question    Please forward my prescriptions for Sumatriptan 100 mg and Rizatriptan 10 mg to the following pharmacy  [facility]  [address] costs are too high

## 2022-01-10 ENCOUNTER — OFFICE VISIT (OUTPATIENT)
Dept: NEUROLOGY | Age: 52
End: 2022-01-10
Payer: COMMERCIAL

## 2022-01-10 VITALS
TEMPERATURE: 97.7 F | SYSTOLIC BLOOD PRESSURE: 131 MMHG | WEIGHT: 191 LBS | DIASTOLIC BLOOD PRESSURE: 86 MMHG | BODY MASS INDEX: 27.41 KG/M2 | OXYGEN SATURATION: 100 % | HEART RATE: 81 BPM | RESPIRATION RATE: 14 BRPM

## 2022-01-10 DIAGNOSIS — G44.011 INTRACTABLE EPISODIC CLUSTER HEADACHE: Primary | ICD-10-CM

## 2022-01-10 PROCEDURE — 99213 OFFICE O/P EST LOW 20 MIN: CPT | Performed by: PSYCHIATRY & NEUROLOGY

## 2022-01-10 RX ORDER — GALCANEZUMAB 100 MG/ML
3 INJECTION, SOLUTION SUBCUTANEOUS
Qty: 3 EACH | Refills: 3 | Status: SHIPPED | OUTPATIENT
Start: 2022-01-10 | End: 2022-01-10

## 2022-01-10 NOTE — PROGRESS NOTES
[facility] and [address] at [facility] at 42 [facility], [address] [facility] [address] [facility], [address] [place] [address]   [phone]              Chief Complaint   Patient presents with    Headache     cluster, doing very well     Current Outpatient Medications   Medication Sig Dispense Refill    galcanezumab-gnlm (Emgality Syringe) 300 mg/3 mL (100 mg/mL x 3) syrg 3 mL by SubCUTAneous route once as needed (Cluster Headache) for up to 1 dose. 3 Each 3    SUMAtriptan (IMITREX) 100 mg tablet Take one tab po at headache onset, may repeat one in 2 hrs for max dose of 2 in 24 hrs 9 Tab 11    rizatriptan (MAXALT) 10 mg tablet TAKE ONE TABLET BY MOUTH ONCE DAILY AS NEEDED FOR MIGRAINE- MAY REPEAT IN 2 HOURS IF NEEDED 9 Tab 11    verapamil (CALAN) 40 mg tablet Take 1 Tab by mouth three (3) times daily. 90 Tab 2    pantoprazole (PROTONIX) 40 mg tablet Take 40 mg by mouth daily.  galcanezumab-gnlm (EMGALITY SYRINGE) 120 mg/mL syrg 120 mg by SubCUTAneous route every thirty (30) days. 1 Pen 2      No Known Allergies  Social History     Tobacco Use    Smoking status: Never Smoker    Smokeless tobacco: Never Used   Substance Use Topics    Alcohol use: Yes    Drug use: Not on file     18-year-old gentleman returns today for follow-up. Last visit with me was about a year ago. He has cluster headache. Typically he clusters around August every year since his 25s. Previous loading dose of Emgality aborted the cluster. I gave him a sample of Emgality to have on hand and he also has some Imitrex and Maxalt at that time. Today he reports taking Emgality at the time he would typically cluster and he did not get a cluster this year. He did not have to use the verapamil. He was very happy.   Tolerated the Emgality    Examination  Visit Vitals  BP (!) 153/89 (BP 1 Location: Left upper arm, BP Patient Position: Sitting, BP Cuff Size: Adult)   Pulse 81   Temp 97.7 °F (36.5 °C)   Resp 14   Wt 86.6 kg (191 lb)   SpO2 100%   BMI 27.41 kg/m²     He is a very pleasant gentleman. He is awake alert and oriented. He has normal speech and normal language. Normal cognition. No A. tach    Impression/Plan  Episodic cluster headache which was aborted with Emgality  Use Emgality in August around time of next cluster  As long as she does well follow-up in a year    Rose Marie Pardo MD        This note was created using voice recognition software. Despite editing, there may be syntax errors.

## 2022-01-10 NOTE — PROGRESS NOTES
Chief Complaint   Patient presents with    Headache     cluster, doing very well     Used Emgality around end of August for cluster episodes, did not have occurrence this year

## 2022-03-06 DIAGNOSIS — G44.029 CHRONIC CLUSTER HEADACHE, NOT INTRACTABLE: ICD-10-CM

## 2022-03-07 RX ORDER — RIZATRIPTAN BENZOATE 10 MG/1
TABLET ORAL
Qty: 9 TABLET | Refills: 11 | Status: SHIPPED | OUTPATIENT
Start: 2022-03-07

## 2022-03-07 RX ORDER — SUMATRIPTAN 100 MG/1
TABLET, FILM COATED ORAL
Qty: 9 TABLET | Refills: 11 | Status: SHIPPED | OUTPATIENT
Start: 2022-03-07

## 2022-09-16 ENCOUNTER — OFFICE VISIT (OUTPATIENT)
Dept: NEUROLOGY | Age: 52
End: 2022-09-16
Payer: COMMERCIAL

## 2022-09-16 VITALS
DIASTOLIC BLOOD PRESSURE: 80 MMHG | BODY MASS INDEX: 27.04 KG/M2 | OXYGEN SATURATION: 97 % | WEIGHT: 188.9 LBS | HEART RATE: 78 BPM | HEIGHT: 70 IN | SYSTOLIC BLOOD PRESSURE: 130 MMHG

## 2022-09-16 DIAGNOSIS — M48.02 CERVICAL STENOSIS OF SPINE: ICD-10-CM

## 2022-09-16 DIAGNOSIS — M79.18 MYOFASCIAL PAIN: Primary | ICD-10-CM

## 2022-09-16 DIAGNOSIS — M50.30 DEGENERATIVE DISC DISEASE, CERVICAL: ICD-10-CM

## 2022-09-16 PROCEDURE — 99214 OFFICE O/P EST MOD 30 MIN: CPT | Performed by: NURSE PRACTITIONER

## 2022-09-16 RX ORDER — TIZANIDINE 4 MG/1
4 TABLET ORAL
Qty: 60 TABLET | Refills: 5 | Status: SHIPPED | OUTPATIENT
Start: 2022-09-16

## 2022-09-16 RX ORDER — PREDNISONE 10 MG/1
TABLET ORAL
Qty: 42 TABLET | Refills: 0 | Status: SHIPPED | OUTPATIENT
Start: 2022-09-16

## 2022-09-16 NOTE — PROGRESS NOTES
Zelda Mcdermott is a 47 y.o. male who presents with the following  Chief Complaint   Patient presents with    Follow-up    Neck Pain     And shari headache       HPI      FU for neck pain, paresthesia, stenosis, DJD. He has been going to chiropractic care for years   He has been through PT  He has not seen much change  He has a significant increase in neck pain   He has pain with flexion,extension, side to side  He states it will cause his headaches to be worse. He states he has felt like this is getting worse  He does drive a lot and this does not help   A short prednisone helped months back   Has tried Flexeril   He has tried other oral medications   It is starting to inhibit his day to day activities. He has noticed that the more he does the worse it gets  He has had XRAYS in the past   No falls  He has increased headaches when this flares up   Can pinpoint area of pain and discomfort. Weakness in the arms           No Known Allergies    Current Outpatient Medications   Medication Sig    predniSONE (DELTASONE) 10 mg tablet 6 po x2 days, 5 po x 2days, 4 po x 2days, 3 po x2days, 2 po x2days, 1 po x 2days    tiZANidine (ZANAFLEX) 4 mg tablet Take 1 Tablet by mouth two (2) times daily as needed for Muscle Spasm(s). SUMAtriptan (IMITREX) 100 mg tablet TAKE ONE TABLET BY MOUTH AT ONSET OF HEADACHE; MAY REPEAT ONE TABLET IN 2 HOURS IF NEEDED. **MAX DOSE OF 2 TABLETS IN 24 HOURS**    rizatriptan (MAXALT) 10 mg tablet TAKE ONE TABLET BY MOUTH AT ONSET OF HEADACHE; MAY REPEAT ONE TABLET IN 2 HOURS IF NEEDED. verapamil (CALAN) 40 mg tablet Take 1 Tab by mouth three (3) times daily. pantoprazole (PROTONIX) 40 mg tablet Take 40 mg by mouth daily. No current facility-administered medications for this visit.        Social History     Tobacco Use   Smoking Status Never   Smokeless Tobacco Never       Past Medical History:   Diagnosis Date    Migraines     Polycythemia        Past Surgical History: Procedure Laterality Date    HX ORTHOPAEDIC      95 RT ankle, 05 left knee       Family History   Problem Relation Age of Onset    Stroke Maternal Grandmother     Cancer Maternal Aunt        Social History     Socioeconomic History    Marital status:    Tobacco Use    Smoking status: Never    Smokeless tobacco: Never   Substance and Sexual Activity    Alcohol use: Yes       Review of Systems   Eyes:  Negative for blurred vision, double vision and photophobia. Respiratory:  Negative for shortness of breath and wheezing. Cardiovascular:  Negative for chest pain and palpitations. Gastrointestinal:  Negative for nausea and vomiting. Musculoskeletal:  Positive for neck pain. Neurological:  Positive for tingling, sensory change, weakness and headaches. Negative for seizures and loss of consciousness. Remainder of comprehensive review is negative. Physical Exam :    Visit Vitals  /80   Pulse 78   Ht 5' 10\" (1.778 m)   Wt 85.7 kg (188 lb 14.4 oz)   SpO2 97%   BMI 27.10 kg/m²       General: Well defined, nourished, and groomed individual in no acute distress. Neck: pain with flexion,extension   Musculoskeletal: Extremities revealed no edema and had full range of motion of joints. Psych: Good mood and bright affect    NEUROLOGICAL EXAMINATION:    Mental Status: Alert and oriented to person, place, and time    Cranial Nerves:    II, III, IV, VI: Visual acuity grossly intact. Visual fields are normal.    Pupils are equal, round, and reactive to light and accommodation. Extra-ocular movements are full and fluid. Fundoscopic exam was benign, no ptosis or nystagmus. V-XII: Hearing is grossly intact. Facial features are symmetric, with normal sensation and strength. The palate rises symmetrically and the tongue protrudes midline. Sternocleidomastoids 5/5. Motor Examination: Normal tone, bulk, and strength, 5/5 muscle strength throughout.  3/5 bilateral UE     Coordination: Finger to nose was normal. No resting or intention tremor    Gait and Station: Steady while walking. Normal arm swing. No pronator drift. No muscle wasting or fasiculations noted. Reflexes: DTRs 2+ throughout. Results for orders placed or performed during the hospital encounter of 07/14/12   TROPONIN I   Result Value Ref Range    Troponin-I, Qt. <0.04 <0.05 ng/mL   LIPASE   Result Value Ref Range    Lipase 115 73 - 144 U/L   METABOLIC PANEL, COMPREHENSIVE   Result Value Ref Range    Sodium 135 (L) 136 - 145 MMOL/L    Potassium 3.7 3.5 - 5.1 MMOL/L    Chloride 99 97 - 108 MMOL/L    CO2 27 21 - 32 MMOL/L    Anion gap 9 5 - 15 mmol/L    Glucose 96 65 - 100 MG/DL    BUN 17 6 - 20 MG/DL    Creatinine 1.0 0.6 - 1.3 MG/DL    BUN/Creatinine ratio 17 12 - 20      GFR est AA >60 >60 ml/min/1.73m2    GFR est non-AA >60 >60 ml/min/1.73m2    Calcium 8.4 (L) 8.5 - 10.1 MG/DL    Bilirubin, total 0.5 0.2 - 1.0 MG/DL    ALT (SGPT) 38 12 - 78 U/L    AST (SGOT) 23 15 - 37 U/L    Alk. phosphatase 98 50 - 136 U/L    Protein, total 7.3 6.4 - 8.2 g/dL    Albumin 3.9 3.5 - 5.0 g/dL    Globulin 3.4 2.0 - 4.0 g/dL    A-G Ratio 1.1 1.1 - 2.2     CK W/ CKMB & INDEX   Result Value Ref Range    CK 84 39 - 308 U/L    CK - MB <0.5 (L) 0.5 - 3.6 NG/ML    CK-MB Index CANNOT BE CALCULATED 0 - 2.5   CBC WITH AUTOMATED DIFF   Result Value Ref Range    WBC 8.9 4.1 - 11.1 K/uL    RBC 4.67 4.10 - 5.70 M/uL    HGB 15.7 12.1 - 17.0 g/dL    HCT 43.0 36.6 - 50.3 %    MCV 92.1 80.0 - 99.0 FL    MCH 33.6 26.0 - 34.0 PG    MCHC 36.5 30.0 - 36.5 g/dL    RDW 12.2 11.5 - 14.5 %    PLATELET 641 675 - 274 K/uL    NEUTROPHILS 54 32 - 75 %    LYMPHOCYTES 33 12 - 49 %    MONOCYTES 9 5 - 13 %    EOSINOPHILS 3 0 - 7 %    BASOPHILS 1 0 - 1 %    ABS. NEUTROPHILS 4.9 1.8 - 8.0 K/UL    ABS. LYMPHOCYTES 2.9 K/UL    ABS. MONOCYTES 0.8 0.0 - 1.0 K/UL    ABS. EOSINOPHILS 0.3 0.0 - 0.4 K/UL    ABS.  BASOPHILS 0.1 0.0 - 0.1 K/UL    DF AUTOMATED    EKG, 12 LEAD, INITIAL   Result Value Ref Range    Ventricular Rate 92 BPM    Atrial Rate 92 BPM    P-R Interval 144 ms    QRS Duration 88 ms    Q-T Interval 358 ms    QTC Calculation (Bezet) 442 ms    Calculated P Axis 22 degrees    Calculated R Axis -5 degrees    Calculated T Axis 6 degrees    Diagnosis       Normal sinus rhythm  Normal ECG  No previous ECGs available  Confirmed by Myesha Bowling MD ([id number]) on 2012 6:00:32 PM       Orders Placed This Encounter    MRI CERV SPINE W WO CONT     Standing Status:   Future     Standing Expiration Date:   10/16/2023     Order Specific Question:   STAT Creatinine as indicated     Answer:   Yes    REFERRAL TO NEUROLOGY     Referral Priority:   Routine     Referral Type:   Consultation     Referral Reason:   Specialty Services Required     Referred to Provider:   Ha Horton NP     Number of Visits Requested:   1    predniSONE (DELTASONE) 10 mg tablet     Si po x2 days, 5 po x 2days, 4 po x 2days, 3 po x2days, 2 po x2days, 1 po x 2days     Dispense:  42 Tablet     Refill:  0    tiZANidine (ZANAFLEX) 4 mg tablet     Sig: Take 1 Tablet by mouth two (2) times daily as needed for Muscle Spasm(s). Dispense:  60 Tablet     Refill:  5       1. Myofascial pain    2. Degenerative disc disease, cervical    3. Cervical stenosis of spine        Prednisone to help break cycle he is in   Zanaflex for neck pain, spasms. Refer for myofascial pain, trigger points. MRI cervical spine to evaluate stenosis, DJD, exam findings.                This note will not be viewable in [facility]

## 2022-09-30 ENCOUNTER — HOSPITAL ENCOUNTER (OUTPATIENT)
Dept: MRI IMAGING | Age: 52
Discharge: HOME | End: 2022-09-30
Attending: NURSE PRACTITIONER
Payer: COMMERCIAL

## 2022-09-30 DIAGNOSIS — M48.02 CERVICAL STENOSIS OF SPINE: ICD-10-CM

## 2022-09-30 DIAGNOSIS — M50.30 DEGENERATIVE DISC DISEASE, CERVICAL: ICD-10-CM

## 2022-09-30 PROCEDURE — 72141 MRI NECK SPINE W/O DYE: CPT

## 2022-10-11 ENCOUNTER — TELEPHONE (OUTPATIENT)
Dept: NEUROLOGY | Age: 52
End: 2022-10-11

## 2022-10-11 NOTE — TELEPHONE ENCOUNTER
Re: TPI    See pt is coming in for TPI, created request in [employer] and per request ID [id number] [place] is not needed.

## 2022-10-18 ENCOUNTER — OFFICE VISIT (OUTPATIENT)
Dept: NEUROLOGY | Age: 52
End: 2022-10-18
Payer: COMMERCIAL

## 2022-10-18 DIAGNOSIS — M48.02 CERVICAL STENOSIS OF SPINE: Primary | ICD-10-CM

## 2022-10-18 DIAGNOSIS — M50.30 DEGENERATIVE DISC DISEASE, CERVICAL: ICD-10-CM

## 2022-10-18 PROCEDURE — 20552 NJX 1/MLT TRIGGER POINT 1/2: CPT | Performed by: NURSE PRACTITIONER

## 2022-10-18 RX ORDER — DICLOFENAC SODIUM 75 MG/1
75 TABLET, DELAYED RELEASE ORAL 2 TIMES DAILY
Qty: 60 TABLET | Refills: 6 | Status: SHIPPED | OUTPATIENT
Start: 2022-10-18

## 2022-10-18 RX ADMIN — BUPIVACAINE HYDROCHLORIDE 50 MG: 5 INJECTION, SOLUTION EPIDURAL; INTRACAUDAL at 12:03

## 2022-10-18 RX ADMIN — METHYLPREDNISOLONE SODIUM SUCCINATE 40 MG: 40 INJECTION, POWDER, LYOPHILIZED, FOR SOLUTION INTRAMUSCULAR; INTRAVENOUS at 12:03

## 2022-10-19 RX ORDER — METHYLPREDNISOLONE SODIUM SUCCINATE 40 MG/ML
40 INJECTION, POWDER, LYOPHILIZED, FOR SOLUTION INTRAMUSCULAR; INTRAVENOUS ONCE
Status: COMPLETED | OUTPATIENT
Start: 2022-10-19 | End: 2022-10-18

## 2022-10-19 RX ORDER — BUPIVACAINE HYDROCHLORIDE 5 MG/ML
50 INJECTION, SOLUTION EPIDURAL; INTRACAUDAL ONCE
Status: COMPLETED | OUTPATIENT
Start: 2022-10-19 | End: 2022-10-18

## 2022-10-19 NOTE — PROGRESS NOTES
[facility]  OFFICE PROCEDURE PROGRESS NOTE        Chart reviewed for the following:   Carol KOO NP, have reviewed the History, Physical and updated the Allergic reactions for 56Carl[address] Alberto Mohamud [place] performed immediately prior to start of procedure:   Carol KOO NP, have performed the following reviews on Angel Renee prior to the start of the procedure:            * Patient was identified by name and date of birth   * Agreement on procedure being performed was verified  * Risks and Benefits explained to the patient  * Procedure site verified and marked as necessary  * Patient was positioned for comfort  * Consent was signed and verified     Time: 1600  Date of procedure: 10/19/2022  Procedure performed by:  Yarelis Meier   Provider assisted by: none  Patient assisted by: self  How tolerated by patient:  tolerated the procedure well with no complications  Comments: none          Angel Renee       [id number]      10/19/2022    PERFORMING PHYSICIAN: AKSSANDRA LEON   PROCEDURE: Trigger point injection  CPT Code: 41220   ICD-10 Code: Myofascial pain, M79.1    SUPPLIES:   10 mL Bupivacaine 0.5%  1 ml 40 mg SOLUMEDROL   27G x 1 and 1/4\" sterile needle     TECHNICAL:   The procedure and potential complications were explained to the patient, and verbal consent was obtained. 11 areas of myofascial tenderness were identified in the trapezius and marked with a ballpoint pen. The above medication was drawn up in a sterile fashion. The prior marked sites were cleaned with alcohol swabs and 1 mL solution was injected at each site without complications. The patient did not complain of any side-effect afterwards and was fully ambulatory.

## 2022-12-02 ENCOUNTER — APPOINTMENT (OUTPATIENT)
Dept: PHYSICAL THERAPY | Age: 52
End: 2022-12-02

## 2023-01-09 ENCOUNTER — OFFICE VISIT (OUTPATIENT)
Dept: NEUROLOGY | Age: 53
End: 2023-01-09
Payer: COMMERCIAL

## 2023-01-09 VITALS — DIASTOLIC BLOOD PRESSURE: 82 MMHG | OXYGEN SATURATION: 99 % | HEART RATE: 85 BPM | SYSTOLIC BLOOD PRESSURE: 124 MMHG

## 2023-01-09 DIAGNOSIS — G43.909 MIGRAINE WITHOUT STATUS MIGRAINOSUS, NOT INTRACTABLE, UNSPECIFIED MIGRAINE TYPE: ICD-10-CM

## 2023-01-09 DIAGNOSIS — M48.02 CERVICAL STENOSIS OF SPINE: Primary | ICD-10-CM

## 2023-01-09 DIAGNOSIS — M79.18 MYOFASCIAL PAIN: ICD-10-CM

## 2023-01-09 PROCEDURE — 20552 NJX 1/MLT TRIGGER POINT 1/2: CPT | Performed by: NURSE PRACTITIONER

## 2023-01-09 PROCEDURE — 99214 OFFICE O/P EST MOD 30 MIN: CPT | Performed by: NURSE PRACTITIONER

## 2023-01-09 RX ORDER — METHYLPREDNISOLONE SODIUM SUCCINATE 40 MG/ML
40 INJECTION, POWDER, LYOPHILIZED, FOR SOLUTION INTRAMUSCULAR; INTRAVENOUS ONCE
Status: COMPLETED | OUTPATIENT
Start: 2023-01-09 | End: 2023-01-09

## 2023-01-09 RX ORDER — BUPIVACAINE HYDROCHLORIDE 5 MG/ML
50 INJECTION, SOLUTION EPIDURAL; INTRACAUDAL ONCE
Status: COMPLETED | OUTPATIENT
Start: 2023-01-09 | End: 2023-01-09

## 2023-01-09 RX ADMIN — BUPIVACAINE HYDROCHLORIDE 50 MG: 5 INJECTION, SOLUTION EPIDURAL; INTRACAUDAL at 13:35

## 2023-01-09 RX ADMIN — METHYLPREDNISOLONE SODIUM SUCCINATE 40 MG: 40 INJECTION, POWDER, LYOPHILIZED, FOR SOLUTION INTRAMUSCULAR; INTRAVENOUS at 13:35

## 2023-01-09 NOTE — PROGRESS NOTES
Kristian Taylor is a 47 y.o. male who presents with the following  Chief Complaint   Patient presents with    Follow-up       HPI    Patient comes in for follow-up of migraines and myofascial pain  He does take the tizanidine when he feels like his neck is acting up  He is currently taking verapamil 40 mg 3 times daily  He has Imitrex or Maxalt as needed  He does take Voltaren as needed  He did get sent to physical therapy and there was a billing issue so he never was able to get into their  He also was sent to pain management and they asked him about what he was doing and he expressed with the chiropractic care and such and they said to come back if he wanted an epidural steroid in the neck    He did get trigger points last visit and this lasted about 3 weeks where he had pretty close to no pain or discomfort at all  It took a week or so to build up and get him but he did feel like it helped  He has not had any kind of cluster but feels like he might be due this year towards the summertime  He did take Emgality with his last cluster and this really helped to try to prevent overall  He is interested in getting trigger point again if he is able to    He is still going to [facility] and getting readjustments and feels this is helping        No Known Allergies    Current Outpatient Medications   Medication Sig    diclofenac EC (VOLTAREN) 75 mg EC tablet Take 1 Tablet by mouth two (2) times a day. tiZANidine (ZANAFLEX) 4 mg tablet Take 1 Tablet by mouth two (2) times daily as needed for Muscle Spasm(s). SUMAtriptan (IMITREX) 100 mg tablet TAKE ONE TABLET BY MOUTH AT ONSET OF HEADACHE; MAY REPEAT ONE TABLET IN 2 HOURS IF NEEDED. **MAX DOSE OF 2 TABLETS IN 24 HOURS**    rizatriptan (MAXALT) 10 mg tablet TAKE ONE TABLET BY MOUTH AT ONSET OF HEADACHE; MAY REPEAT ONE TABLET IN 2 HOURS IF NEEDED. verapamil (CALAN) 40 mg tablet Take 1 Tab by mouth three (3) times daily.     pantoprazole (PROTONIX) 40 mg tablet Take 40 mg by mouth daily. No current facility-administered medications for this visit. Social History     Tobacco Use   Smoking Status Never   Smokeless Tobacco Never       Past Medical History:   Diagnosis Date    Migraines     Polycythemia        Past Surgical History:   Procedure Laterality Date    HX ORTHOPAEDIC      95 RT ankle, 05 left knee       Family History   Problem Relation Age of Onset    Stroke Maternal Grandmother     Cancer Maternal Aunt        Social History     Socioeconomic History    Marital status:    Tobacco Use    Smoking status: Never    Smokeless tobacco: Never   Substance and Sexual Activity    Alcohol use: Yes       ROS    Remainder of comprehensive review is negative. Physical Exam :    Visit Vitals  /82 (BP 1 Location: Left upper arm, BP Patient Position: Sitting, BP Cuff Size: Adult)   Pulse 85   SpO2 99%       General: Well defined, nourished, and groomed individual in no acute distress. Neck: Supple, nontender, no bruits, no pain with resistance to active range of motion. Heart: Regular rate and rhythm, no murmurs, rub, or gallop. Normal S1S2. Lungs: Clear to auscultation bilaterally with equal chest expansion, no cough, no wheeze  Musculoskeletal: Extremities revealed no edema and had full range of motion of joints. Psych: Good mood and bright affect    NEUROLOGICAL EXAMINATION:    Mental Status: Alert and oriented to person, place, and time    Cranial Nerves:    II, III, IV, VI: Visual acuity grossly intact. Visual fields are normal.    Pupils are equal, round, and reactive to light and accommodation. Extra-ocular movements are full and fluid. Fundoscopic exam was benign, no ptosis or nystagmus. V-XII: Hearing is grossly intact. Facial features are symmetric, with normal sensation and strength. The palate rises symmetrically and the tongue protrudes midline. Sternocleidomastoids 5/5.      Motor Examination: Normal tone, bulk, and strength, 5/5 muscle strength throughout. Coordination: Finger to nose was normal. No resting or intention tremor    Gait and Station: Steady while walking. Normal arm swing. No pronator drift. No muscle wasting or fasiculations noted. Reflexes: DTRs 2+ throughout. Neurosensory Exam:  Stocking glove sensory loss to temperature, vibration, and pinprick to the thighs. Results for orders placed or performed during the hospital encounter of 07/14/12   TROPONIN I   Result Value Ref Range    Troponin-I, Qt. <0.04 <0.05 ng/mL   LIPASE   Result Value Ref Range    Lipase 115 73 - 763 U/L   METABOLIC PANEL, COMPREHENSIVE   Result Value Ref Range    Sodium 135 (L) 136 - 145 MMOL/L    Potassium 3.7 3.5 - 5.1 MMOL/L    Chloride 99 97 - 108 MMOL/L    CO2 27 21 - 32 MMOL/L    Anion gap 9 5 - 15 mmol/L    Glucose 96 65 - 100 MG/DL    BUN 17 6 - 20 MG/DL    Creatinine 1.0 0.6 - 1.3 MG/DL    BUN/Creatinine ratio 17 12 - 20      GFR est AA >60 >60 ml/min/1.73m2    GFR est non-AA >60 >60 ml/min/1.73m2    Calcium 8.4 (L) 8.5 - 10.1 MG/DL    Bilirubin, total 0.5 0.2 - 1.0 MG/DL    ALT (SGPT) 38 12 - 78 U/L    AST (SGOT) 23 15 - 37 U/L    Alk. phosphatase 98 50 - 136 U/L    Protein, total 7.3 6.4 - 8.2 g/dL    Albumin 3.9 3.5 - 5.0 g/dL    Globulin 3.4 2.0 - 4.0 g/dL    A-G Ratio 1.1 1.1 - 2.2     CK W/ CKMB & INDEX   Result Value Ref Range    CK 84 39 - 308 U/L    CK - MB <0.5 (L) 0.5 - 3.6 NG/ML    CK-MB Index CANNOT BE CALCULATED 0 - 2.5   CBC WITH AUTOMATED DIFF   Result Value Ref Range    WBC 8.9 4.1 - 11.1 K/uL    RBC 4.67 4.10 - 5.70 M/uL    HGB 15.7 12.1 - 17.0 g/dL    HCT 43.0 36.6 - 50.3 %    MCV 92.1 80.0 - 99.0 FL    MCH 33.6 26.0 - 34.0 PG    MCHC 36.5 30.0 - 36.5 g/dL    RDW 12.2 11.5 - 14.5 %    PLATELET 291 124 - 507 K/uL    NEUTROPHILS 54 32 - 75 %    LYMPHOCYTES 33 12 - 49 %    MONOCYTES 9 5 - 13 %    EOSINOPHILS 3 0 - 7 %    BASOPHILS 1 0 - 1 %    ABS. NEUTROPHILS 4.9 1.8 - 8.0 K/UL    ABS. LYMPHOCYTES 2.9 K/UL    ABS. MONOCYTES 0.8 0.0 - 1.0 K/UL    ABS. EOSINOPHILS 0.3 0.0 - 0.4 K/UL    ABS. BASOPHILS 0.1 0.0 - 0.1 K/UL    DF AUTOMATED    EKG, 12 LEAD, INITIAL   Result Value Ref Range    Ventricular Rate 92 BPM    Atrial Rate 92 BPM    P-R Interval 144 ms    QRS Duration 88 ms    Q-T Interval 358 ms    QTC Calculation (Bezet) 442 ms    Calculated P Axis 22 degrees    Calculated R Axis -5 degrees    Calculated T Axis 6 degrees    Diagnosis       Normal sinus rhythm  Normal ECG  No previous ECGs available  Confirmed by Steven Bowling MD ([id number]) on 7/16/2012 6:00:32 PM       Orders Placed This Encounter    INJECT TRIGGER POINT, 1 OR 2    methylPREDNISolone (SOLU-MEDROL) injection 40 mg    bupivacaine (PF) (MARCAINE) 0.5 % (5 mg/mL) injection 50 mg       1. Cervical stenosis of spine    2. Myofascial pain    3.  Migraine without status migrainosus, not intractable, unspecified migraine type                    This note will not be viewable in [address] [place] [address] NOTE        Chart reviewed for the following:   ICarol NP, have reviewed the History, Physical and updated the Allergic reactions for 56Carlos Alberto Mohamud [place] performed immediately prior to start of procedure:   I, Hravey Bañuelos NP, have performed the following reviews on Jam Saunders prior to the start of the procedure:            * Patient was identified by name and date of birth   * Agreement on procedure being performed was verified  * Risks and Benefits explained to the patient  * Procedure site verified and marked as necessary  * Patient was positioned for comfort  * Consent was signed and verified     Time: 1100  Date of procedure: 1/9/2023  Procedure performed by:  Yane Reza   Provider assisted by: none  Patient assisted by: self  How tolerated by patient:  tolerated the procedure well with no complications  Comments: none          Anibal Alvarez       [id number]      1/9/2023    PERFORMING PHYSICIAN: KASSANDRA LEON   PROCEDURE: Trigger point injection  CPT Code: 47835   ICD-10 Code: Myofascial pain, M79.1    SUPPLIES:   10 mL Bupivacaine 0.5%  1 ml 40 mg Solumedrol   27G x 1 and 1/4\" sterile needle     TECHNICAL:   The procedure and potential complications were explained to the patient, and verbal consent was obtained. 11 areas of myofascial tenderness were identified in the trapezius and marked with a ballpoint pen. The above medication was drawn up in a sterile fashion. The prior marked sites were cleaned with alcohol swabs and 1 mL solution was injected at each site without complications. The patient did not complain of any side-effect afterwards and was fully ambulatory.

## 2023-01-09 NOTE — PROGRESS NOTES
TPI took a few days for it to work, worked for for about 3 weeks  Pains currently have been on and off  Will use muscle relaxer at night to help  Headaches have been doing very well  Last one about year

## 2023-03-31 DIAGNOSIS — M50.30 DEGENERATIVE DISC DISEASE, CERVICAL: ICD-10-CM

## 2023-03-31 DIAGNOSIS — G44.029 CHRONIC CLUSTER HEADACHE, NOT INTRACTABLE: ICD-10-CM

## 2023-03-31 DIAGNOSIS — M79.18 MYOFASCIAL PAIN: ICD-10-CM

## 2023-03-31 RX ORDER — SUMATRIPTAN 100 MG/1
TABLET, FILM COATED ORAL
Qty: 9 TABLET | Refills: 11 | Status: SHIPPED | OUTPATIENT
Start: 2023-03-31

## 2023-03-31 RX ORDER — PREDNISONE 10 MG/1
TABLET ORAL
Qty: 42 TABLET | Refills: 0 | Status: SHIPPED | OUTPATIENT
Start: 2023-03-31

## 2023-03-31 RX ORDER — RIZATRIPTAN BENZOATE 10 MG/1
TABLET ORAL
Qty: 9 TABLET | Refills: 11 | Status: SHIPPED | OUTPATIENT
Start: 2023-03-31

## 2023-04-18 ENCOUNTER — OFFICE VISIT (OUTPATIENT)
Dept: NEUROLOGY | Age: 53
End: 2023-04-18
Payer: COMMERCIAL

## 2023-04-18 DIAGNOSIS — M79.18 MYOFASCIAL PAIN: Primary | ICD-10-CM

## 2023-04-18 DIAGNOSIS — M48.02 CERVICAL STENOSIS OF SPINE: ICD-10-CM

## 2023-04-18 DIAGNOSIS — G43.909 MIGRAINE WITHOUT STATUS MIGRAINOSUS, NOT INTRACTABLE, UNSPECIFIED MIGRAINE TYPE: ICD-10-CM

## 2023-04-18 DIAGNOSIS — G44.029 CHRONIC CLUSTER HEADACHE, NOT INTRACTABLE: ICD-10-CM

## 2023-04-18 DIAGNOSIS — M50.30 DEGENERATIVE DISC DISEASE, CERVICAL: ICD-10-CM

## 2023-04-18 PROCEDURE — 20552 NJX 1/MLT TRIGGER POINT 1/2: CPT | Performed by: NURSE PRACTITIONER

## 2023-04-18 RX ORDER — METHYLPREDNISOLONE ACETATE 40 MG/ML
40 INJECTION, SUSPENSION INTRA-ARTICULAR; INTRALESIONAL; INTRAMUSCULAR; SOFT TISSUE ONCE
Status: COMPLETED | OUTPATIENT
Start: 2023-04-18 | End: 2023-04-18

## 2023-04-18 RX ORDER — RIZATRIPTAN BENZOATE 10 MG/1
TABLET ORAL
Qty: 27 TABLET | Refills: 11 | Status: SHIPPED | OUTPATIENT
Start: 2023-04-18

## 2023-04-18 RX ORDER — BUPIVACAINE HYDROCHLORIDE 5 MG/ML
50 INJECTION, SOLUTION EPIDURAL; INTRACAUDAL ONCE
Status: COMPLETED | OUTPATIENT
Start: 2023-04-18 | End: 2023-04-18

## 2023-04-18 RX ORDER — SUMATRIPTAN 100 MG/1
TABLET, FILM COATED ORAL
Qty: 27 TABLET | Refills: 11 | Status: SHIPPED | OUTPATIENT
Start: 2023-04-18

## 2023-04-18 RX ORDER — DICLOFENAC SODIUM 75 MG/1
75 TABLET, DELAYED RELEASE ORAL 2 TIMES DAILY
Qty: 180 TABLET | Refills: 1 | Status: SHIPPED | OUTPATIENT
Start: 2023-04-18

## 2023-04-18 RX ORDER — TIZANIDINE 4 MG/1
4 TABLET ORAL
Qty: 180 TABLET | Refills: 1 | Status: SHIPPED | OUTPATIENT
Start: 2023-04-18

## 2023-04-18 RX ADMIN — METHYLPREDNISOLONE ACETATE 40 MG: 40 INJECTION, SUSPENSION INTRA-ARTICULAR; INTRALESIONAL; INTRAMUSCULAR; SOFT TISSUE at 12:45

## 2023-04-18 RX ADMIN — BUPIVACAINE HYDROCHLORIDE 50 MG: 5 INJECTION, SOLUTION EPIDURAL; INTRACAUDAL at 12:44

## 2023-04-18 NOTE — PROGRESS NOTES
[facility]  OFFICE PROCEDURE PROGRESS NOTE        Chart reviewed for the following:   Wyatt KOO NP, have reviewed the History, Physical and updated the Allergic reactions for [address] performed immediately prior to start of procedure:   Wyatt KOO NP, have performed the following reviews on Amada Macias prior to the start of the procedure:            * Patient was identified by name and date of birth   * Agreement on procedure being performed was verified  * Risks and Benefits explained to the patient  * Procedure site verified and marked as necessary  * Patient was positioned for comfort  * Consent was signed and verified     Time: 1140  Date of procedure: 4/18/2023  Procedure performed by:  Orquidea Mccarthy   Provider assisted by: none  Patient assisted by: self  How tolerated by patient:  tolerated the procedure well with no complications  Comments: none          Amada Macias       [id number]      4/18/2023    PERFORMING PHYSICIAN: KASSANDRA LEON   PROCEDURE: Trigger point injection  CPT Code: 62419   ICD-10 Code: Myofascial pain, M79.1    SUPPLIES:   10 mL Bupivacaine 0.5%  1 ml solumedrol 40 mg   27G x 1 and 1/4\" sterile needle     TECHNICAL:   The procedure and potential complications were explained to the patient, and verbal consent was obtained. 11 areas of myofascial tenderness were identified in the trapezius and marked with a ballpoint pen. The above medication was drawn up in a sterile fashion. The prior marked sites were cleaned with alcohol swabs and 1 mL solution was injected at each site without complications. The patient did not complain of any side-effect afterwards and was fully ambulatory.

## 2023-08-21 ENCOUNTER — OFFICE VISIT (OUTPATIENT)
Age: 53
End: 2023-08-21
Payer: COMMERCIAL

## 2023-08-21 VITALS — SYSTOLIC BLOOD PRESSURE: 142 MMHG | DIASTOLIC BLOOD PRESSURE: 96 MMHG

## 2023-08-21 DIAGNOSIS — M50.30 OTHER CERVICAL DISC DEGENERATION, UNSPECIFIED CERVICAL REGION: ICD-10-CM

## 2023-08-21 DIAGNOSIS — M79.18 MYALGIA, OTHER SITE: Primary | ICD-10-CM

## 2023-08-21 DIAGNOSIS — M48.02 SPINAL STENOSIS, CERVICAL REGION: ICD-10-CM

## 2023-08-21 PROCEDURE — 20552 NJX 1/MLT TRIGGER POINT 1/2: CPT | Performed by: NURSE PRACTITIONER

## 2023-08-21 RX ADMIN — METHYLPREDNISOLONE SODIUM SUCCINATE 40 MG: 40 INJECTION, POWDER, LYOPHILIZED, FOR SOLUTION INTRAMUSCULAR; INTRAVENOUS at 12:04

## 2023-08-21 RX ADMIN — BUPIVACAINE HYDROCHLORIDE 50 MG: 5 INJECTION, SOLUTION EPIDURAL; INTRACAUDAL at 12:03

## 2023-08-21 NOTE — PROGRESS NOTES
Neck pains have been up and down  Would like TPI, did help but takes time to work and varies on efficacy  Hasnt had any [facility]

## 2023-08-24 RX ORDER — METHYLPREDNISOLONE SODIUM SUCCINATE 40 MG/ML
40 INJECTION, POWDER, LYOPHILIZED, FOR SOLUTION INTRAMUSCULAR; INTRAVENOUS ONCE
Status: COMPLETED | OUTPATIENT
Start: 2023-08-24 | End: 2023-08-21

## 2023-08-24 RX ORDER — BUPIVACAINE HYDROCHLORIDE 5 MG/ML
30 INJECTION, SOLUTION EPIDURAL; INTRACAUDAL ONCE
Status: COMPLETED | OUTPATIENT
Start: 2023-08-24 | End: 2023-08-21

## 2023-08-24 NOTE — PROGRESS NOTES
[address] [address]             OFFICE PROCEDURE NOTE    Patient Name: Jason Meier  YOB: 1970  MRN: [id number]    PROCEDURE: Trigger Point Injection  Date of Procedure:8/21/2023  CPT Code:  17340    ICD-10 Code:     ICD-10-CM    1. Myalgia, other site  M79.18 External Referral To Pain Clinic     bupivacaine (PF) (MARCAINE) 0.5 % injection 150 mg     methylPREDNISolone sodium succ (SOLU-MEDROL) injection 40 mg     INJECT TRIGGER POINT, 1 OR 2      2. Spinal stenosis, cervical region  M48.02 External Referral To Pain Clinic     bupivacaine (PF) (MARCAINE) 0.5 % injection 150 mg     methylPREDNISolone sodium succ (SOLU-MEDROL) injection 40 mg     INJECT TRIGGER POINT, 1 OR 2      3. Other cervical disc degeneration, unspecified cervical region  M50.30 External Referral To Pain Clinic     bupivacaine (PF) (MARCAINE) 0.5 % injection 150 mg     methylPREDNISolone sodium succ (SOLU-MEDROL) injection 40 mg     INJECT TRIGGER POINT, 1 OR 2          Time Out performed immediately prior to the start of procedure:  I, EMMA Matos NP, have performed the following review on Jason Meier prior to the start of the procedure: Trigger Point Injection. The patient was identified by name and date of birth. Agreement on the procedure to be performed was verified. The potential Benefits and potential Risks were explained to the patient. The procedure site(s) were verified and marked as necessary. Consent was signed and verified. The patient was positioned for comfort. Time: 1200. Date of Procedure: 8/21/2023. Procedure performed by: EMMA Matos NP. Provider assisted by: none. Patient assisted by: self. How patient tolerated procedure: mild procedure-related pain, no complications. Comments: none.       Medications/ Supplies:     11 mL preservative-free bupivicaine

## 2023-09-10 DIAGNOSIS — M50.30 OTHER CERVICAL DISC DEGENERATION, UNSPECIFIED CERVICAL REGION: Primary | ICD-10-CM

## 2023-09-11 RX ORDER — DICLOFENAC SODIUM 75 MG/1
75 TABLET, DELAYED RELEASE ORAL 2 TIMES DAILY
Qty: 180 TABLET | Refills: 1 | Status: SHIPPED | OUTPATIENT
Start: 2023-09-11

## 2023-11-13 RX ORDER — TIZANIDINE 4 MG/1
TABLET ORAL
Qty: 180 TABLET | Refills: 1 | Status: SHIPPED | OUTPATIENT
Start: 2023-11-13

## 2023-11-16 ENCOUNTER — PROCEDURE VISIT (OUTPATIENT)
Age: 53
End: 2023-11-16
Payer: COMMERCIAL

## 2023-11-16 DIAGNOSIS — M48.02 SPINAL STENOSIS, CERVICAL REGION: Primary | ICD-10-CM

## 2023-11-16 DIAGNOSIS — G44.029 CHRONIC CLUSTER HEADACHE, NOT INTRACTABLE: ICD-10-CM

## 2023-11-16 DIAGNOSIS — R20.2 PARESTHESIA: ICD-10-CM

## 2023-11-16 DIAGNOSIS — R41.3 AMNESIA: ICD-10-CM

## 2023-11-16 PROCEDURE — 99215 OFFICE O/P EST HI 40 MIN: CPT | Performed by: NURSE PRACTITIONER

## 2023-11-16 PROCEDURE — 20552 NJX 1/MLT TRIGGER POINT 1/2: CPT | Performed by: NURSE PRACTITIONER

## 2023-11-20 RX ORDER — METHYLPREDNISOLONE ACETATE 40 MG/ML
40 INJECTION, SUSPENSION INTRA-ARTICULAR; INTRALESIONAL; INTRAMUSCULAR; SOFT TISSUE ONCE
Status: COMPLETED | OUTPATIENT
Start: 2023-11-16 | End: 2023-11-20

## 2023-11-20 RX ORDER — BUPIVACAINE HYDROCHLORIDE 5 MG/ML
11 INJECTION, SOLUTION PERINEURAL ONCE
Status: COMPLETED | OUTPATIENT
Start: 2023-11-16 | End: 2023-11-20

## 2023-11-20 RX ADMIN — BUPIVACAINE HYDROCHLORIDE 55 MG: 5 INJECTION, SOLUTION PERINEURAL at 08:35

## 2023-11-20 RX ADMIN — METHYLPREDNISOLONE ACETATE 40 MG: 40 INJECTION, SUSPENSION INTRA-ARTICULAR; INTRALESIONAL; INTRAMUSCULAR; SOFT TISSUE at 08:35

## 2023-11-20 NOTE — PROGRESS NOTES
Evangelina Ornelas is a 46 y.o. male who presents with the following  Chief Complaint   Patient presents with    Procedure     TPI       HPI    FU for TPI and new concern of possibly amnesia spell     He tolerated TPI well     He continues to have neck pain and paresthesia  He is due for a repeat MRI as we are trying to get with pain management. We will refer to be re-evaluated   Had stenosis, annular tear, and DJD over a year ago   He continues to take the conservative route but continues to have debilitating pain, paresthesia  He has had multiple TPI   Mostly pain on the right side. Has been on multiple muscle relaxer and Diclofenac   Currently has Tizanadine. Has Imitrex ,Maxalt for cluster migraine     He states the other day working he has a lapse in time for about 4 hours  He remembers nothing of what happened  He had a meeting with some colleagues and does not remember this   Does not remember who they were or what they looked like   He has no recollection   First and last time this has happened. No head trauma, falls. No significant family HX. No Known Allergies    Current Outpatient Medications   Medication Sig Dispense Refill    tiZANidine (ZANAFLEX) 4 MG tablet TAKE 1 TABLET TWICE A DAY  AS NEEDED FOR MUSCLE       SPASM(S) 180 tablet 1    diclofenac (VOLTAREN) 75 MG EC tablet TAKE 1 TABLET TWICE A  tablet 1    pantoprazole (PROTONIX) 40 MG tablet Take 1 tablet by mouth daily      rizatriptan (MAXALT) 10 MG tablet TAKE ONE TABLET BY MOUTH AT ONSET OF HEADACHE; MAY REPEAT ONE TABLET IN 2 HOURS IF NEEDED. SUMAtriptan (IMITREX) 100 MG tablet TAKE ONE TABLET BY MOUTH AT ONSET OF HEADACHE; MAY REPEAT ONE TABLET IN 2 HOURS IF NEEDED. **MAX DOSE OF 2 TABLETS IN 24 HOURS**       No current facility-administered medications for this visit.         Social History     Tobacco Use   Smoking Status Never   Smokeless Tobacco Never       Past Medical History:   Diagnosis Date
the trapezius and marked with a ballpoint pen. The above medication was drawn up in a sterile fashion. The prior marked sites were cleaned with alcohol swabs and 1 mL solution was injected at each site without complications. The patient appeared to have tolerated the procedure well (mild procedure related pain) without any complications.       Signed By: EMMA Piña NP     November 20, 2023

## 2023-12-05 ENCOUNTER — HOSPITAL ENCOUNTER (OUTPATIENT)
Facility: HOSPITAL | Age: 53
Discharge: HOME | End: 2023-12-08
Payer: COMMERCIAL

## 2023-12-05 DIAGNOSIS — R20.2 PARESTHESIA: ICD-10-CM

## 2023-12-05 DIAGNOSIS — G44.029 CHRONIC CLUSTER HEADACHE, NOT INTRACTABLE: ICD-10-CM

## 2023-12-05 DIAGNOSIS — R41.3 AMNESIA: ICD-10-CM

## 2023-12-05 DIAGNOSIS — M48.02 SPINAL STENOSIS, CERVICAL REGION: ICD-10-CM

## 2023-12-05 PROCEDURE — A9579 GAD-BASE MR CONTRAST NOS,1ML: HCPCS | Performed by: NURSE PRACTITIONER

## 2023-12-05 PROCEDURE — 72141 MRI NECK SPINE W/O DYE: CPT

## 2023-12-05 PROCEDURE — 70553 MRI BRAIN STEM W/O & W/DYE: CPT

## 2023-12-05 PROCEDURE — 6360000004 HC RX CONTRAST MEDICATION: Performed by: NURSE PRACTITIONER

## 2023-12-05 RX ADMIN — GADOTERIDOL 16 ML: 279.3 INJECTION, SOLUTION INTRAVENOUS at 14:15

## 2024-01-11 ENCOUNTER — TELEPHONE (OUTPATIENT)
Age: 54
End: 2024-01-11

## 2024-01-11 NOTE — TELEPHONE ENCOUNTER
Re: Mindy Jones PA in [facility] key# [id number], approval [id number], auth# [id number], effective 01/02/24-01/01/25, scanned letter to chart. Fyi to nurse.

## 2024-02-16 ENCOUNTER — PROCEDURE VISIT (OUTPATIENT)
Age: 54
End: 2024-02-16

## 2024-02-16 DIAGNOSIS — M50.30 OTHER CERVICAL DISC DEGENERATION, UNSPECIFIED CERVICAL REGION: ICD-10-CM

## 2024-02-16 DIAGNOSIS — G44.029 CHRONIC CLUSTER HEADACHE, NOT INTRACTABLE: Primary | ICD-10-CM

## 2024-02-16 DIAGNOSIS — M48.02 SPINAL STENOSIS, CERVICAL REGION: Primary | ICD-10-CM

## 2024-02-16 RX ORDER — BUPIVACAINE HYDROCHLORIDE 5 MG/ML
5 INJECTION, SOLUTION PERINEURAL ONCE
Status: COMPLETED | OUTPATIENT
Start: 2024-02-16 | End: 2024-02-16

## 2024-02-16 RX ORDER — METHYLPREDNISOLONE ACETATE 40 MG/ML
40 INJECTION, SUSPENSION INTRA-ARTICULAR; INTRALESIONAL; INTRAMUSCULAR; SOFT TISSUE ONCE
Status: COMPLETED | OUTPATIENT
Start: 2024-02-16 | End: 2024-02-16

## 2024-02-16 RX ADMIN — METHYLPREDNISOLONE ACETATE 40 MG: 40 INJECTION, SUSPENSION INTRA-ARTICULAR; INTRALESIONAL; INTRAMUSCULAR; SOFT TISSUE at 10:53

## 2024-02-16 RX ADMIN — BUPIVACAINE HYDROCHLORIDE 25 MG: 5 INJECTION, SOLUTION PERINEURAL at 10:52

## 2024-02-16 NOTE — PROGRESS NOTES
[facility] - [address]  [facility] NEUROLOGY CLINIC [place]  [address] [address]             OFFICE PROCEDURE NOTE    Patient Name: Marlon Smith  YOB: 1970  MRN: [id number]    PROCEDURE: Trigger Point Injection  Date of Procedure: 2/16/2024   CPT Code:  02080    ICD-10 Code:     ICD-10-CM    1. Spinal stenosis, cervical region  M48.02 External Referral To Pain Clinic     BUPivacaine (MARCAINE) 0.5 % injection 25 mg     methylPREDNISolone acetate (DEPO-MEDROL) injection 40 mg      2. Other cervical disc degeneration, unspecified cervical region  M50.30 External Referral To Pain Clinic     BUPivacaine (MARCAINE) 0.5 % injection 25 mg     methylPREDNISolone acetate (DEPO-MEDROL) injection 40 mg          Time Out performed immediately prior to the start of procedure:  Siddhartha HOUSER APRN - NP, have performed the following review on Marlon Smith prior to the start of the procedure: Trigger Point Injection.  The patient was identified by name and date of birth.  Agreement on the procedure to be performed was verified.  The potential Benefits and potential Risks were explained to the patient.  The procedure site(s) were verified and marked as necessary.  Consent was signed and verified.  The patient was positioned for comfort.  Time: 1000.  Date of Procedure: 2/16/2024 . Procedure performed by: EMMA Clements NP.  Provider assisted by: none.  Patient assisted by: self.  How patient tolerated procedure: mild procedure-related pain, no complications.  Comments: none.      Medications/ Supplies:     5 mL preservative-free 0.5%Bupivicaine   1 mL Depo-Medrol 40mg/ mL   27G x 1 and 1/4\" sterile needle     TECHNICAL:    The procedure and potential complications were explained to the patient, and verbal consent was obtained. 6 areas of myofascial tenderness were identified in the right trapezius and marked with a ballpoint pen. The above

## 2024-02-18 RX ORDER — RIZATRIPTAN BENZOATE 10 MG/1
TABLET ORAL
Qty: 27 TABLET | Refills: 0 | Status: SHIPPED | OUTPATIENT
Start: 2024-02-18 | End: 2024-03-07 | Stop reason: SDUPTHER

## 2024-02-18 RX ORDER — DICLOFENAC SODIUM 75 MG/1
TABLET, DELAYED RELEASE ORAL
Qty: 180 TABLET | Refills: 0 | Status: SHIPPED | OUTPATIENT
Start: 2024-02-18 | End: 2024-03-15 | Stop reason: SDUPTHER

## 2024-03-07 DIAGNOSIS — G44.029 CHRONIC CLUSTER HEADACHE, NOT INTRACTABLE: ICD-10-CM

## 2024-03-08 RX ORDER — RIZATRIPTAN BENZOATE 10 MG/1
TABLET ORAL
Qty: 27 TABLET | Refills: 1 | Status: SHIPPED | OUTPATIENT
Start: 2024-03-08

## 2024-03-08 RX ORDER — SUMATRIPTAN 100 MG/1
TABLET, FILM COATED ORAL
Qty: 27 TABLET | Refills: 3 | Status: SHIPPED | OUTPATIENT
Start: 2024-03-08 | End: 2024-07-07

## 2024-03-08 NOTE — TELEPHONE ENCOUNTER
Please review and sign if appropriate.  Message from patient      rizatriptan (MAXALT) 10 MG tablet [Siddhartha Contreras, APRN - NP]      Patient Comment: Please contact [facility] about this perscription. There is no frequency of useage and the perscription is held up as a result. I also need the Sumatriptan which has been perscribed to them as Imitrex and not the generic. I need the generic of both. It is ridiculous that I have had to spend this time trying to obtain two scripts that I have been taking for years. Please apprise ASAP

## 2024-03-15 DIAGNOSIS — M50.30 OTHER CERVICAL DISC DEGENERATION, UNSPECIFIED CERVICAL REGION: ICD-10-CM

## 2024-03-15 RX ORDER — DICLOFENAC SODIUM 75 MG/1
TABLET, DELAYED RELEASE ORAL
Qty: 180 TABLET | Refills: 2 | Status: SHIPPED | OUTPATIENT
Start: 2024-03-15

## 2024-05-24 ENCOUNTER — PROCEDURE VISIT (OUTPATIENT)
Age: 54
End: 2024-05-24

## 2024-05-24 DIAGNOSIS — G44.029 CHRONIC CLUSTER HEADACHE, NOT INTRACTABLE: ICD-10-CM

## 2024-05-24 DIAGNOSIS — M50.30 OTHER CERVICAL DISC DEGENERATION, UNSPECIFIED CERVICAL REGION: ICD-10-CM

## 2024-05-24 DIAGNOSIS — M79.18 MYOFASCIAL PAIN: Primary | ICD-10-CM

## 2024-05-24 RX ORDER — DICLOFENAC SODIUM 75 MG/1
TABLET, DELAYED RELEASE ORAL
Qty: 180 TABLET | Refills: 1 | Status: SHIPPED | OUTPATIENT
Start: 2024-05-24

## 2024-05-24 RX ORDER — METHYLPREDNISOLONE SODIUM SUCCINATE 40 MG/ML
40 INJECTION, POWDER, LYOPHILIZED, FOR SOLUTION INTRAMUSCULAR; INTRAVENOUS ONCE
Status: COMPLETED | OUTPATIENT
Start: 2024-05-24 | End: 2024-05-24

## 2024-05-24 RX ORDER — DICLOFENAC SODIUM 75 MG/1
TABLET, DELAYED RELEASE ORAL
Qty: 180 TABLET | Refills: 2 | Status: SHIPPED | OUTPATIENT
Start: 2024-05-24 | End: 2024-05-24

## 2024-05-24 RX ORDER — RIZATRIPTAN BENZOATE 10 MG/1
TABLET ORAL
Qty: 27 TABLET | Refills: 1 | Status: SHIPPED | OUTPATIENT
Start: 2024-05-24

## 2024-05-24 RX ORDER — BUPIVACAINE HYDROCHLORIDE 5 MG/ML
5 INJECTION, SOLUTION PERINEURAL ONCE
Status: COMPLETED | OUTPATIENT
Start: 2024-05-24 | End: 2024-05-24

## 2024-05-24 RX ADMIN — METHYLPREDNISOLONE SODIUM SUCCINATE 40 MG: 40 INJECTION, POWDER, LYOPHILIZED, FOR SOLUTION INTRAMUSCULAR; INTRAVENOUS at 15:50

## 2024-05-24 RX ADMIN — BUPIVACAINE HYDROCHLORIDE 25 MG: 5 INJECTION, SOLUTION PERINEURAL at 15:50

## 2024-05-24 NOTE — PROGRESS NOTES
[facility] - 2436  [facility] NEUROLOGY CLINIC [place]  [address] [address]             OFFICE PROCEDURE NOTE    Patient Name: Marlon Smith  YOB: 1970  MRN: [id number]    PROCEDURE: Trigger Point Injection  Date of Procedure: 5/24/2024   CPT Code:  88974    ICD-10 Code:     ICD-10-CM    1. Myofascial pain  M79.18 INJECT TRIGGER POINT, 1 OR 2     BUPivacaine (MARCAINE) 0.5 % injection 25 mg     methylPREDNISolone sodium succ (SOLU-MEDROL) injection 40 mg      2. Chronic cluster headache, not intractable  G44.029 rizatriptan (MAXALT) 10 MG tablet      3. Other cervical disc degeneration, unspecified cervical region  M50.30 diclofenac (VOLTAREN) 75 MG EC tablet          Time Out performed immediately prior to the start of procedure:  Siddhartha HOUSER APRN - NP, have performed the following review on Marlon Smith prior to the start of the procedure: Trigger Point Injection.  The patient was identified by name and date of birth.  Agreement on the procedure to be performed was verified.  The potential Benefits and potential Risks were explained to the patient.  The procedure site(s) were verified and marked as necessary.  Consent was signed and verified.  The patient was positioned for comfort.  Time: 1400.  Date of Procedure: 5/24/2024 . Procedure performed by: EMMA Clements NP.  Provider assisted by: none.  Patient assisted by: self.  How patient tolerated procedure: mild procedure-related pain, no complications.  Comments: none.      Medications/ Supplies:     5 mL preservative-free 0.5 % Bupivicaine   1 mL Depo-Medrol 40mg/ mL   27G x 1 and 1/4\" sterile needle     TECHNICAL:    The procedure and potential complications were explained to the patient, and verbal consent was obtained. 6 areas of myofascial tenderness were identified in the trapezius and marked with a ballpoint pen. The above medication was drawn up in a

## 2024-07-15 DIAGNOSIS — G44.029 CHRONIC CLUSTER HEADACHE, NOT INTRACTABLE: ICD-10-CM

## 2024-07-15 RX ORDER — RIZATRIPTAN BENZOATE 10 MG/1
TABLET ORAL
Qty: 27 TABLET | Refills: 1 | Status: SHIPPED | OUTPATIENT
Start: 2024-07-15

## 2024-07-18 ENCOUNTER — TELEPHONE (OUTPATIENT)
Age: 54
End: 2024-07-18

## 2024-07-18 NOTE — TELEPHONE ENCOUNTER
RE:Rizatriptan    Case submitted via EPIC has been approved      Effect:07/17/2024-07/17/2025    Letter in media  Nurse notified

## 2024-08-13 ENCOUNTER — TELEPHONE (OUTPATIENT)
Age: 54
End: 2024-08-13

## 2024-08-15 ENCOUNTER — PROCEDURE VISIT (OUTPATIENT)
Age: 54
End: 2024-08-15
Payer: COMMERCIAL

## 2024-08-15 DIAGNOSIS — M79.18 MYOFASCIAL PAIN: Primary | ICD-10-CM

## 2024-08-15 PROCEDURE — 20552 NJX 1/MLT TRIGGER POINT 1/2: CPT | Performed by: NURSE PRACTITIONER

## 2024-08-15 RX ORDER — METHYLPREDNISOLONE SODIUM SUCCINATE 40 MG/ML
40 INJECTION, POWDER, LYOPHILIZED, FOR SOLUTION INTRAMUSCULAR; INTRAVENOUS ONCE
Status: COMPLETED | OUTPATIENT
Start: 2024-08-15 | End: 2024-08-15

## 2024-08-15 RX ORDER — PREDNISONE 10 MG/1
TABLET ORAL
Qty: 42 TABLET | Refills: 1 | Status: SHIPPED | OUTPATIENT
Start: 2024-08-15

## 2024-08-15 RX ORDER — BUPIVACAINE HYDROCHLORIDE 2.5 MG/ML
9 INJECTION, SOLUTION INFILTRATION; PERINEURAL ONCE
Status: COMPLETED | OUTPATIENT
Start: 2024-08-15 | End: 2024-08-15

## 2024-08-15 RX ADMIN — METHYLPREDNISOLONE SODIUM SUCCINATE 40 MG: 40 INJECTION, POWDER, LYOPHILIZED, FOR SOLUTION INTRAMUSCULAR; INTRAVENOUS at 13:02

## 2024-08-15 RX ADMIN — BUPIVACAINE HYDROCHLORIDE 22.5 MG: 2.5 INJECTION, SOLUTION INFILTRATION; PERINEURAL at 13:02

## 2024-08-15 NOTE — PROGRESS NOTES
[facility] - 2436  [facility] NEUROLOGY CLINIC [place]  [address] [address]             OFFICE PROCEDURE NOTE    Patient Name: Marlon Smith  YOB: 1970  MRN: [id number]    PROCEDURE: Trigger Point Injection  Date of Procedure: 8/15/2024   CPT Code:  29143    ICD-10 Code:     ICD-10-CM    1. Myofascial pain  M79.18 BUPivacaine (MARCAINE) 0.25 % injection 22.5 mg     INJECT TRIGGER POINT, 1 OR 2     methylPREDNISolone sodium succ (SOLU-MEDROL) injection 40 mg          Time Out performed immediately prior to the start of procedure:  ISiddhartha APRN - NP, have performed the following review on Marlon Smith prior to the start of the procedure: Trigger Point Injection.  The patient was identified by name and date of birth.  Agreement on the procedure to be performed was verified.  The potential Benefits and potential Risks were explained to the patient.  The procedure site(s) were verified and marked as necessary.  Consent was signed and verified.  The patient was positioned for comfort.  Time: 1100.  Date of Procedure: 8/15/2024 . Procedure performed by: EMMA Clements NP.  Provider assisted by: none.  Patient assisted by: self.  How patient tolerated procedure: mild procedure-related pain, no complications.  Comments: none.      Medications/ Supplies:     9 mL preservative-free 0.25 % BUPIVACAINE   1 mL Depo-Medrol 40mg/ mL   27G x 1 and 1/4\" sterile needle     TECHNICAL:    The procedure and potential complications were explained to the patient, and verbal consent was obtained. 10 areas of myofascial tenderness were identified in the trapezius and marked with a ballpoint pen. The above medication was drawn up in a sterile fashion.  The prior marked sites were cleaned with alcohol swabs and 1 mL solution was injected at each site without complications.  The patient appeared to have tolerated the procedure well (mild

## 2024-10-04 DIAGNOSIS — M50.30 OTHER CERVICAL DISC DEGENERATION, UNSPECIFIED CERVICAL REGION: ICD-10-CM

## 2024-10-04 RX ORDER — DICLOFENAC SODIUM 75 MG/1
TABLET, DELAYED RELEASE ORAL
Qty: 180 TABLET | Refills: 1 | Status: SHIPPED | OUTPATIENT
Start: 2024-10-04

## 2024-11-21 ENCOUNTER — PROCEDURE VISIT (OUTPATIENT)
Age: 54
End: 2024-11-21

## 2024-11-21 DIAGNOSIS — M79.18 MYALGIA, OTHER SITE: ICD-10-CM

## 2024-11-21 DIAGNOSIS — M48.02 SPINAL STENOSIS, CERVICAL REGION: Primary | ICD-10-CM

## 2024-11-22 RX ORDER — BUPIVACAINE HYDROCHLORIDE 2.5 MG/ML
30 INJECTION, SOLUTION INFILTRATION; PERINEURAL ONCE
Status: COMPLETED | OUTPATIENT
Start: 2024-11-22 | End: 2024-11-22

## 2024-11-22 RX ADMIN — BUPIVACAINE HYDROCHLORIDE 75 MG: 2.5 INJECTION, SOLUTION INFILTRATION; PERINEURAL at 12:19

## 2024-11-22 NOTE — PROGRESS NOTES
[facility] - 2436  [facility] NEUROLOGY CLINIC [place]  [address] [facility] [address]             OFFICE PROCEDURE NOTE    Patient Name: Marlon mSith  YOB: 1970  MRN: [id number]    PROCEDURE: Trigger Point Injection  Date of Procedure: 11/21/2024  CPT Code:  63187    ICD-10 Code:     ICD-10-CM    1. Spinal stenosis, cervical region  M48.02 BUPivacaine (MARCAINE) 0.25 % injection 75 mg     INJECT TRIGGER POINT, 1 OR 2      2. Myalgia, other site  M79.18 BUPivacaine (MARCAINE) 0.25 % injection 75 mg     INJECT TRIGGER POINT, 1 OR 2          Time Out performed immediately prior to the start of procedure:  ISiddhartha APRN - NP, have performed the following review on Marlon Smith prior to the start of the procedure: Trigger Point Injection.  The patient was identified by name and date of birth.  Agreement on the procedure to be performed was verified.  The potential Benefits and potential Risks were explained to the patient.  The procedure site(s) were verified and marked as necessary.  Consent was signed and verified.  The patient was positioned for comfort.  Time: 1100.  Date of Procedure: 11/21/2024 . Procedure performed by: EMMA Clements NP.  Provider assisted by: none.  Patient assisted by: self.  How patient tolerated procedure: mild procedure-related pain, no complications.  Comments: none.      Medications/ Supplies:     30 ML 0.25 Bupivacaine     27G x 1 and 1/4\" sterile needle     TECHNICAL:    The procedure and potential complications were explained to the patient, and verbal consent was obtained. 30  areas of myofascial tenderness were identified in the trapezius and marked with a ballpoint pen. The above medication was drawn up in a sterile fashion.  The prior marked sites were cleaned with alcohol swabs and 1 mL solution was injected at each site without complications.  The patient appeared to have tolerated the

## 2025-02-04 DIAGNOSIS — G43.909 MIGRAINE WITHOUT STATUS MIGRAINOSUS, NOT INTRACTABLE, UNSPECIFIED MIGRAINE TYPE: Primary | ICD-10-CM

## 2025-02-05 RX ORDER — GALCANEZUMAB 120 MG/ML
INJECTION, SOLUTION SUBCUTANEOUS
Qty: 1 ML | Refills: 2 | Status: ACTIVE | OUTPATIENT
Start: 2025-02-05

## 2025-02-10 RX ORDER — PREDNISONE 10 MG/1
TABLET ORAL
Qty: 42 TABLET | Refills: 1 | Status: SHIPPED | OUTPATIENT
Start: 2025-02-10

## 2025-02-13 ENCOUNTER — PROCEDURE VISIT (OUTPATIENT)
Age: 55
End: 2025-02-13
Payer: COMMERCIAL

## 2025-02-13 DIAGNOSIS — M79.18 MYOFASCIAL PAIN: Primary | ICD-10-CM

## 2025-02-13 PROCEDURE — 20552 NJX 1/MLT TRIGGER POINT 1/2: CPT | Performed by: NURSE PRACTITIONER

## 2025-02-13 RX ORDER — BUPIVACAINE HYDROCHLORIDE 2.5 MG/ML
20 INJECTION, SOLUTION INFILTRATION; PERINEURAL ONCE
Status: COMPLETED | OUTPATIENT
Start: 2025-02-13 | End: 2025-02-13

## 2025-02-13 RX ADMIN — BUPIVACAINE HYDROCHLORIDE 50 MG: 2.5 INJECTION, SOLUTION INFILTRATION; PERINEURAL at 15:50

## 2025-02-13 NOTE — PROGRESS NOTES
[facility] - [address]  [facility] NEUROLOGY CLINIC [place]  [address] [address]             OFFICE PROCEDURE NOTE    Patient Name: Marlon Smith  YOB: 1970  MRN: [id number]    PROCEDURE: Trigger Point Injection  Date of Procedure: 2/13/2025   CPT Code:  74284    ICD-10 Code:     ICD-10-CM    1. Myofascial pain  M79.18 BUPivacaine (MARCAINE) 0.25 % injection 50 mg     INJECT TRIGGER POINT, 1 OR 2          Time Out performed immediately prior to the start of procedure:  ISiddhartha APRN - NP, have performed the following review on Marlon Smith prior to the start of the procedure: Trigger Point Injection.  The patient was identified by name and date of birth.  Agreement on the procedure to be performed was verified.  The potential Benefits and potential Risks were explained to the patient.  The procedure site(s) were verified and marked as necessary.  Consent was signed and verified.  The patient was positioned for comfort.  Time: 1500.  Date of Procedure: 2/13/2025 . Procedure performed by: EMMA Clements NP.  Provider assisted by: none.  Patient assisted by: self.  How patient tolerated procedure: mild procedure-related pain, no complications.  Comments: none.      Medications/ Supplies:     20 mL preservative-free 0.5% Bupivacaine     27G x 1 and 1/4\" sterile needle     TECHNICAL:    The procedure and potential complications were explained to the patient, and verbal consent was obtained. 20 areas of myofascial tenderness were identified in the trapezius and marked with a ballpoint pen. The above medication was drawn up in a sterile fashion.  The prior marked sites were cleaned with alcohol swabs and 1 mL solution was injected at each site without complications.  The patient appeared to have tolerated the procedure well (mild procedure related pain) without any complications.      Signed By: EMMA Clements NP

## 2025-02-15 DIAGNOSIS — G44.029 CHRONIC CLUSTER HEADACHE, NOT INTRACTABLE: ICD-10-CM

## 2025-02-16 RX ORDER — RIZATRIPTAN BENZOATE 10 MG/1
TABLET ORAL
Qty: 27 TABLET | Refills: 1 | Status: SHIPPED | OUTPATIENT
Start: 2025-02-16 | End: 2025-04-14

## 2025-03-18 RX ORDER — SUMATRIPTAN SUCCINATE 100 MG/1
TABLET ORAL
Qty: 27 TABLET | Refills: 3 | Status: SHIPPED | OUTPATIENT
Start: 2025-03-18

## 2025-03-25 DIAGNOSIS — M50.30 OTHER CERVICAL DISC DEGENERATION, UNSPECIFIED CERVICAL REGION: ICD-10-CM

## 2025-03-25 RX ORDER — DICLOFENAC SODIUM 75 MG/1
75 TABLET, DELAYED RELEASE ORAL 2 TIMES DAILY
Qty: 180 TABLET | Refills: 1 | Status: SHIPPED | OUTPATIENT
Start: 2025-03-25

## 2025-04-13 DIAGNOSIS — G44.029 CHRONIC CLUSTER HEADACHE, NOT INTRACTABLE: ICD-10-CM

## 2025-04-14 RX ORDER — RIZATRIPTAN BENZOATE 10 MG/1
TABLET ORAL
Qty: 27 TABLET | Refills: 1 | Status: SHIPPED | OUTPATIENT
Start: 2025-04-14 | End: 2025-05-31

## 2025-05-06 ENCOUNTER — TELEPHONE (OUTPATIENT)
Age: 55
End: 2025-05-06

## 2025-05-06 NOTE — TELEPHONE ENCOUNTER
RE:TPI      Per [employer]    [id number]  M79.18 DOES NOT require authorization.    Nurse notified    
1.73

## 2025-05-15 ENCOUNTER — PROCEDURE VISIT (OUTPATIENT)
Age: 55
End: 2025-05-15
Payer: COMMERCIAL

## 2025-05-15 DIAGNOSIS — M79.18 MYOFASCIAL PAIN: Primary | ICD-10-CM

## 2025-05-15 PROCEDURE — 20552 NJX 1/MLT TRIGGER POINT 1/2: CPT | Performed by: NURSE PRACTITIONER

## 2025-05-15 RX ORDER — BUPIVACAINE HYDROCHLORIDE 5 MG/ML
18 INJECTION, SOLUTION PERINEURAL ONCE
Status: COMPLETED | OUTPATIENT
Start: 2025-05-15 | End: 2025-05-15

## 2025-05-15 RX ORDER — METHYLPREDNISOLONE SODIUM SUCCINATE 40 MG/ML
80 INJECTION INTRAMUSCULAR; INTRAVENOUS ONCE
Status: COMPLETED | OUTPATIENT
Start: 2025-05-15 | End: 2025-05-15

## 2025-05-15 RX ADMIN — BUPIVACAINE HYDROCHLORIDE 90 MG: 5 INJECTION, SOLUTION PERINEURAL at 16:14

## 2025-05-15 RX ADMIN — METHYLPREDNISOLONE SODIUM SUCCINATE 80 MG: 40 INJECTION INTRAMUSCULAR; INTRAVENOUS at 16:15

## 2025-05-15 NOTE — PROGRESS NOTES
[facility] - 2436  [facility] NEUROLOGY CLINIC [place]  [address] [facility] [address] [facility] [address]             OFFICE PROCEDURE NOTE    Patient Name: Marlon Smith  YOB: 1970  MRN: [id number]    PROCEDURE: Trigger Point Injection  Date of Procedure: 5/15/2025   CPT Code:  85809    ICD-10 Code:     ICD-10-CM    1. Myofascial pain  M79.18 INJECT TRIGGER POINT, 1 OR 2     BUPivacaine (MARCAINE) 0.5 % injection 90 mg     methylPREDNISolone sodium succ (SOLU-MEDROL) injection 80 mg          Time Out performed immediately prior to the start of procedure:  ISiddhartha DNP, have performed the following review on Marlon Smith prior to the start of the procedure: Trigger Point Injection.  The patient was identified by name and date of birth.  Agreement on the procedure to be performed was verified.  The potential Benefits and potential Risks were explained to the patient.  The procedure site(s) were verified and marked as necessary.  Consent was signed and verified.  The patient was positioned for comfort.  Time: 1500.  Date of Procedure: 5/15/2025 . Procedure performed by: Siddhartha Contreras DNP.  Provider assisted by: none.  Patient assisted by: self.  How patient tolerated procedure: mild procedure-related pain, no complications.  Comments: none.      Medications/ Supplies:     18 mL preservative-free 0.5% Bupivacaine   2 mL Depo-Medrol 40mg/ mL   27G x 1 and 1/4\" sterile needle     TECHNICAL:    The procedure and potential complications were explained to the patient, and verbal consent was obtained. 20 areas of myofascial tenderness were identified in the trapezius and marked with a ballpoint pen. The above medication was drawn up in a sterile fashion.  The prior marked sites were cleaned with alcohol swabs and 1 mL solution was injected at each site without complications.  The patient appeared to have tolerated the procedure well (mild procedure related

## 2025-05-31 DIAGNOSIS — G44.029 CHRONIC CLUSTER HEADACHE, NOT INTRACTABLE: ICD-10-CM

## 2025-05-31 RX ORDER — RIZATRIPTAN BENZOATE 10 MG/1
TABLET ORAL
Qty: 27 TABLET | Refills: 1 | Status: SHIPPED | OUTPATIENT
Start: 2025-05-31

## 2025-06-25 ENCOUNTER — OFFICE VISIT (OUTPATIENT)
Age: 55
End: 2025-06-25

## 2025-06-25 VITALS
HEIGHT: 70 IN | OXYGEN SATURATION: 96 % | BODY MASS INDEX: 26.88 KG/M2 | TEMPERATURE: 98.1 F | RESPIRATION RATE: 13 BRPM | DIASTOLIC BLOOD PRESSURE: 73 MMHG | WEIGHT: 187.8 LBS | HEART RATE: 89 BPM | SYSTOLIC BLOOD PRESSURE: 128 MMHG
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
## 2025-06-25 DIAGNOSIS — S40.862A INSECT BITE OF LEFT UPPER ARM, INITIAL ENCOUNTER: Primary | ICD-10-CM

## 2025-06-25 RX ORDER — DOXYCYCLINE HYCLATE 100 MG
100 TABLET ORAL 2 TIMES DAILY
Qty: 14 TABLET | Refills: 0 | Status: SHIPPED | OUTPATIENT
Start: 2025-06-25 | End: 2025-07-02

## 2025-06-25 RX ORDER — LISINOPRIL 20 MG/1
20 TABLET ORAL DAILY
COMMUNITY
Start: 2025-06-14 | End: 2025-07-14

## 2025-06-25 RX ORDER — DEXAMETHASONE SODIUM PHOSPHATE 10 MG/ML
10 INJECTION, SOLUTION INTRAMUSCULAR; INTRAVENOUS ONCE
Status: COMPLETED | OUTPATIENT
Start: 2025-06-25 | End: 2025-06-25

## 2025-06-25 RX ADMIN — DEXAMETHASONE SODIUM PHOSPHATE 10 MG: 10 INJECTION, SOLUTION INTRAMUSCULAR; INTRAVENOUS at 18:48

## 2025-06-25 ASSESSMENT — ENCOUNTER SYMPTOMS
GASTROINTESTINAL NEGATIVE: 1
RESPIRATORY NEGATIVE: 1
EYES NEGATIVE: 1
ALLERGIC/IMMUNOLOGIC NEGATIVE: 1

## 2025-06-25 NOTE — PROGRESS NOTES
Medications   Medication Sig Dispense Refill    lisinopril (PRINIVIL;ZESTRIL) 20 MG tablet Take 1 tablet by mouth daily      doxycycline hyclate (VIBRA-TABS) 100 MG tablet Take 1 tablet by mouth 2 times daily for 7 days 14 tablet 0    rizatriptan (MAXALT) 10 MG tablet TAKE ONE TABLET BY MOUTH AT ONSET OF HEADACHE AND REPEAT IN 2 HOURS AS NEEDED; MAX DAILY DOSE 2 TABLETS IN 24 HOURS 27 tablet 1    diclofenac (VOLTAREN) 75 MG EC tablet TAKE ONE TABLET BY MOUTH TWICE A  tablet 1    SUMAtriptan (IMITREX) 100 MG tablet TAKE ONE TABLET BY MOUTH AT ONSET OF HEADACHE AND REPEAT IN 2 HOURS IF NEEDED MAXIMUM OF 2 TABLETS IN 24 HOURS 27 tablet 3    predniSONE (DELTASONE) 10 MG tablet TAKE 6 TABLETS BY MOUTH DAILY FOR 2 DAYS, 5 TABS DAILY FOR 2 DAYS, 4 TABS DAILY FOR 2 DAYS, 3 TABS DAILY FOR 2 DAYS, 2 TBAS DAILY FOR 2 DAYS, AND 1 TAB DAILY FOR 2 DAYS 42 tablet 1    EMGALITY 120 MG/ML SOAJ INJECT ONE PEN SUBCUTANEOUSLY ONCE MONTHLY AS NEEDED FOR CLUSTER HEADACHE 1 mL 2    Galcanezumab-gnlm (EMGALITY, 300 MG DOSE,) 100 MG/ML SOSY Inject 300 mg into the skin every 28 days 3 mL 3    tiZANidine (ZANAFLEX) 4 MG tablet TAKE 1 TABLET TWICE A DAY  AS NEEDED FOR MUSCLE       SPASM(S) 180 tablet 1    pantoprazole (PROTONIX) 40 MG tablet Take 1 tablet by mouth daily       No current facility-administered medications for this visit.        Past Medical History:   Diagnosis Date    Migraines     Polycythemia         Past Surgical History:   Procedure Laterality Date    ORTHOPEDIC SURGERY      95 RT ankle, 05 left knee        Social History:   Social Connections: Not on file        Patient Care Team:  Quincy Sanchez MD as PCP - General  Quincy Sanchez MD as PCP - Empaneled Provider    Patient Active Problem List   Diagnosis    Cluster headache            I ADVISED PATIENT TO GO TO ER IF SYMPTOMS WORSEN , CHANGE OR FAILS TO IMPROVE.      I have discussed the diagnosis with the patient and the intended plan as seen in the above

## 2025-08-15 ENCOUNTER — PROCEDURE VISIT (OUTPATIENT)
Age: 55
End: 2025-08-15

## 2025-08-15 DIAGNOSIS — M79.18 MYOFASCIAL PAIN: Primary | ICD-10-CM

## 2025-08-15 RX ORDER — BUPIVACAINE HYDROCHLORIDE 5 MG/ML
18 INJECTION, SOLUTION PERINEURAL ONCE
Status: COMPLETED | OUTPATIENT
Start: 2025-08-15 | End: 2025-08-15

## 2025-08-15 RX ADMIN — BUPIVACAINE HYDROCHLORIDE 90 MG: 5 INJECTION, SOLUTION PERINEURAL at 09:03
